# Patient Record
Sex: FEMALE | Race: WHITE
[De-identification: names, ages, dates, MRNs, and addresses within clinical notes are randomized per-mention and may not be internally consistent; named-entity substitution may affect disease eponyms.]

---

## 2019-06-27 ENCOUNTER — HOSPITAL ENCOUNTER (INPATIENT)
Dept: HOSPITAL 54 - TELE | Age: 79
LOS: 13 days | Discharge: NURSING, REHAB OR LONG-TERM CARE | DRG: 242 | End: 2019-07-10
Attending: INTERNAL MEDICINE | Admitting: NURSE PRACTITIONER
Payer: MEDICARE

## 2019-06-27 VITALS — WEIGHT: 189.19 LBS | BODY MASS INDEX: 34.82 KG/M2 | HEIGHT: 62 IN

## 2019-06-27 VITALS — DIASTOLIC BLOOD PRESSURE: 84 MMHG | SYSTOLIC BLOOD PRESSURE: 159 MMHG

## 2019-06-27 DIAGNOSIS — T45.1X5A: ICD-10-CM

## 2019-06-27 DIAGNOSIS — J98.11: ICD-10-CM

## 2019-06-27 DIAGNOSIS — E87.6: ICD-10-CM

## 2019-06-27 DIAGNOSIS — J15.6: ICD-10-CM

## 2019-06-27 DIAGNOSIS — E83.42: ICD-10-CM

## 2019-06-27 DIAGNOSIS — C83.30: ICD-10-CM

## 2019-06-27 DIAGNOSIS — E83.39: ICD-10-CM

## 2019-06-27 DIAGNOSIS — E11.649: ICD-10-CM
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
## 2019-06-27 DIAGNOSIS — I10: ICD-10-CM

## 2019-06-27 DIAGNOSIS — B37.81: Primary | ICD-10-CM

## 2019-06-27 DIAGNOSIS — Z91.81: ICD-10-CM

## 2019-06-27 DIAGNOSIS — J96.10: ICD-10-CM

## 2019-06-27 DIAGNOSIS — E88.09: ICD-10-CM

## 2019-06-27 DIAGNOSIS — B00.2: ICD-10-CM

## 2019-06-27 DIAGNOSIS — B37.0: ICD-10-CM

## 2019-06-27 DIAGNOSIS — D61.810: ICD-10-CM

## 2019-06-27 DIAGNOSIS — K22.8: ICD-10-CM

## 2019-06-27 DIAGNOSIS — E11.65: ICD-10-CM

## 2019-06-27 DIAGNOSIS — D68.69: ICD-10-CM

## 2019-06-27 DIAGNOSIS — Z87.891: ICD-10-CM

## 2019-06-27 DIAGNOSIS — Z96.649: ICD-10-CM

## 2019-06-27 DIAGNOSIS — B37.83: ICD-10-CM

## 2019-06-27 DIAGNOSIS — E80.6: ICD-10-CM

## 2019-06-27 DIAGNOSIS — D64.81: ICD-10-CM

## 2019-06-27 DIAGNOSIS — E44.0: ICD-10-CM

## 2019-06-27 DIAGNOSIS — R13.10: ICD-10-CM

## 2019-06-27 DIAGNOSIS — Z79.899: ICD-10-CM

## 2019-06-27 DIAGNOSIS — Z80.6: ICD-10-CM

## 2019-06-27 DIAGNOSIS — E11.9: ICD-10-CM

## 2019-06-27 DIAGNOSIS — J90: ICD-10-CM

## 2019-06-27 DIAGNOSIS — Z22.322: ICD-10-CM

## 2019-06-27 DIAGNOSIS — E66.01: ICD-10-CM

## 2019-06-27 PROCEDURE — A4216 STERILE WATER/SALINE, 10 ML: HCPCS

## 2019-06-27 PROCEDURE — C1751 CATH, INF, PER/CENT/MIDLINE: HCPCS

## 2019-06-27 PROCEDURE — A9563 P32 NA PHOSPHATE: HCPCS

## 2019-06-27 PROCEDURE — G0378 HOSPITAL OBSERVATION PER HR: HCPCS

## 2019-06-27 NOTE — NUR
TELE LVN INITIAL NOTES

RECEIVED A DIRECT ADMIT FROM [facility]  ACCOMPANIED BY PARA MED[facility]. PT IS ALERT 
ORIENTED SEEMS SO TIRED BUT ABLE TO COMMUNICATE . WITH 02 AT 3 LITERS VIA NASAL CANULA. NO 
SIGNS OF ANY DISTRESS NOTED. AWARE WHERE SHE AT ORIENTED HER HOW TO USED THE CALL LIGHT 
SYSTEM. SKIN WARM AND DRY TO TOUCH NOTICED SOME RASH AND DISCOLORATION ON HER LEFT ARM AND 
LOWER LEGS. NO EDEMA NOTED. SON ALSO AT THE BEDSIDE AND HELPED TO PROVIDE SOME INFORMATION 
REGARDING HIS MOM. TELE SINUS RHYTHM PER MONITOR. KEPT HER WARM AND COMFORTABLE AT ALL 
TIMES. WILL CONTINUE MONITORING.

## 2019-06-28 VITALS — DIASTOLIC BLOOD PRESSURE: 84 MMHG | SYSTOLIC BLOOD PRESSURE: 159 MMHG

## 2019-06-28 VITALS — DIASTOLIC BLOOD PRESSURE: 77 MMHG | SYSTOLIC BLOOD PRESSURE: 156 MMHG

## 2019-06-28 VITALS — SYSTOLIC BLOOD PRESSURE: 145 MMHG | DIASTOLIC BLOOD PRESSURE: 80 MMHG

## 2019-06-28 VITALS — SYSTOLIC BLOOD PRESSURE: 155 MMHG | DIASTOLIC BLOOD PRESSURE: 86 MMHG

## 2019-06-28 VITALS — SYSTOLIC BLOOD PRESSURE: 145 MMHG | DIASTOLIC BLOOD PRESSURE: 75 MMHG

## 2019-06-28 LAB
ALBUMIN SERPL BCP-MCNC: 2 G/DL (ref 3.4–5)
ALP SERPL-CCNC: 65 U/L (ref 46–116)
ALT SERPL W P-5'-P-CCNC: 24 U/L (ref 12–78)
AST SERPL W P-5'-P-CCNC: 28 U/L (ref 15–37)
BASOPHILS # BLD AUTO: 0 /CMM (ref 0–0.2)
BASOPHILS NFR BLD AUTO: 0.3 % (ref 0–2)
BILIRUB SERPL-MCNC: 1.1 MG/DL (ref 0.2–1)
BUN SERPL-MCNC: 10 MG/DL (ref 7–18)
CALCIUM SERPL-MCNC: 6.3 MG/DL (ref 8.5–10.1)
CHLORIDE SERPL-SCNC: 101 MMOL/L (ref 98–107)
CO2 SERPL-SCNC: 28 MMOL/L (ref 21–32)
CREAT SERPL-MCNC: 0.6 MG/DL (ref 0.6–1.3)
EOSINOPHIL NFR BLD AUTO: 1.4 % (ref 0–6)
GLUCOSE SERPL-MCNC: 72 MG/DL (ref 74–106)
HCT VFR BLD AUTO: 36 % (ref 33–45)
HGB BLD-MCNC: 11.8 G/DL (ref 11.5–14.8)
LYMPHOCYTES NFR BLD AUTO: 0.2 /CMM (ref 0.8–4.8)
LYMPHOCYTES NFR BLD AUTO: 6 % (ref 20–44)
MAGNESIUM SERPL-MCNC: 1.2 MG/DL (ref 1.8–2.4)
MCHC RBC AUTO-ENTMCNC: 33 G/DL (ref 31–36)
MCV RBC AUTO: 83 FL (ref 82–100)
MONOCYTES NFR BLD AUTO: 0 /CMM (ref 0.1–1.3)
MONOCYTES NFR BLD AUTO: 0.4 % (ref 2–12)
NEUTROPHILS # BLD AUTO: 3.5 /CMM (ref 1.8–8.9)
NEUTROPHILS NFR BLD AUTO: 91.9 % (ref 43–81)
PHOSPHATE SERPL-MCNC: 2.3 MG/DL (ref 2.5–4.9)
PLATELET # BLD AUTO: 195 /CMM (ref 150–450)
POTASSIUM SERPL-SCNC: 3.3 MMOL/L (ref 3.5–5.1)
PROT SERPL-MCNC: 5 G/DL (ref 6.4–8.2)
RBC # BLD AUTO: 4.31 MIL/UL (ref 4–5.2)
SODIUM SERPL-SCNC: 137 MMOL/L (ref 136–145)
WBC NRBC COR # BLD AUTO: 3.8 K/UL (ref 4.3–11)

## 2019-06-28 RX ADMIN — POTASSIUM CHLORIDE SCH MLS/HR: 200 INJECTION, SOLUTION INTRAVENOUS at 13:23

## 2019-06-28 RX ADMIN — MAGNESIUM SULFATE IN DEXTROSE SCH MLS/HR: 10 INJECTION, SOLUTION INTRAVENOUS at 02:39

## 2019-06-28 RX ADMIN — Medication SCH UNIT: at 09:18

## 2019-06-28 RX ADMIN — Medication PRN MG: at 14:37

## 2019-06-28 RX ADMIN — Medication PRN MG: at 22:29

## 2019-06-28 RX ADMIN — MAGNESIUM OXIDE TAB 400 MG (241.3 MG ELEMENTAL MG) SCH MG: 400 (241.3 MG) TAB at 18:45

## 2019-06-28 RX ADMIN — POTASSIUM CHLORIDE SCH MLS/HR: 200 INJECTION, SOLUTION INTRAVENOUS at 14:31

## 2019-06-28 RX ADMIN — MAGNESIUM SULFATE IN DEXTROSE SCH MLS/HR: 10 INJECTION, SOLUTION INTRAVENOUS at 15:31

## 2019-06-28 RX ADMIN — FAMOTIDINE SCH MG: 10 INJECTION INTRAVENOUS at 12:25

## 2019-06-28 RX ADMIN — Medication PRN MG: at 02:58

## 2019-06-28 RX ADMIN — MAGNESIUM SULFATE IN DEXTROSE SCH MLS/HR: 10 INJECTION, SOLUTION INTRAVENOUS at 03:58

## 2019-06-28 RX ADMIN — FAMOTIDINE SCH MG: 10 INJECTION INTRAVENOUS at 21:13

## 2019-06-28 RX ADMIN — Medication SCH EA: at 21:13

## 2019-06-28 RX ADMIN — MAGNESIUM SULFATE IN DEXTROSE SCH MLS/HR: 10 INJECTION, SOLUTION INTRAVENOUS at 12:26

## 2019-06-28 RX ADMIN — DEXTROSE AND SODIUM CHLORIDE PRN MLS/HR: 5; 450 INJECTION, SOLUTION INTRAVENOUS at 12:01

## 2019-06-28 RX ADMIN — Medication SCH UNIT: at 12:44

## 2019-06-28 NOTE — NUR
RN NOTES



PATIENTS' MAGNESIUM LEVEL WAS RECHECKED THIS AFTERNOON AND WAS 1.5 AFTER GIVING 2 BAGS OF 
MAGNESIUM 1/GM 100ML TODAY. DR WEST MADE AWARE WITH ORDER TO START MAGNESIUM OXIDE 800MG 
BID. WILL CARRY OUT ORDER. MONITORING CONTINUES.

## 2019-06-28 NOTE — NUR
TELE RN OPENING NOTES

 

RECEIVED PATIENT ASLEEP ON BED. IVF STILL INFUSING ON HER LEFT AC PATENT AND INTACT. NO 
REDNESS NOTED. TELE SINUS RHYTHM WITH OCCASIONAL PAC WITH HEART RATE OF 70'S PER MONITOR. PT 
STILL WITH O2 AT 3 LITERS VIA NASAL CANULA. NO SIGNS OF ANY DISTRESS NOTED. ON SEMI FOWLERS 
POSITION WITH SIDE RAILS UP AND BED ALARM SET FOR PT SAFETY. CALL LIGHT WITHIN REACH. WILL 
CONTINUE TO MONITOR.

## 2019-06-28 NOTE — NUR
MS RN NOTE:



PATIENT SON AT BEDSIDE REQUESTING FROM BREATHING TREATMENT THAT PATIENT NORMALLY TAKES AT 
HOME. ON CALL DR. CATHIE WHITING ON FLOOR AND REQUEST IF PATIENT CAN RECEIVE BREATHING 
TREATMENT. VENTOLIN 2.5MG NEB Q6 HOURS AS NEEDED. ORDER NOTED AND CARRIED OUT. WILL CONTINUE 
TO MONITOR.

## 2019-06-28 NOTE — NUR
TELE RN NOTES



RECEIVED ORDER FOR X-RAY ESOPHAGRAM, EXPLAINED THE PROCEDURE TO THE PATIENT AND SON. CONSENT 
SIGNED BY DANNI CASTELLANO (SON) WHICH IS THE DPOA AND FILED.

## 2019-06-28 NOTE — NUR
TELE LVN NOTES

 PT COMPLAINED OF GENERALIZED PAIN SPECIALLY HER UPPER BACK  9/10. SPOKE TO DR KNOWLES AND 
GOT ORDERED MORPHINE 2 MG Q 6 HRS PRN  NOTED AND VERIFIED.

## 2019-06-28 NOTE — NUR
RN NOTES



SWALLOW EVALUATION DONE WITH ORDERS TO CHANGE DIET FROM NPO TO PUREED DIET WITH NECTAR THICK 
LIQUIDS WITH ASPIRATION PRECAUTION.

## 2019-06-28 NOTE — NUR
MS  RN OPENING NOTES

 

PATIENT IN BED AWAKE AND RESTING AT MODERATE HIGH BACKREST POSITION WITH FAMILY AT BEDSIDE. 
A/O X3. VERBALLY RESPONSIVE. ON SUPPLEMENTAL O2 AT 3 LPM VIA N/C, TOLERATING WELL WITH NO 
SIGNS OF DISTRESS NOTED. IV ACCESS ON LEFT AC PATENT AND INTACT, IVF INFUSING AS ORDERED, NO 
S/S OF INFILTRATIONS NOTED. ALL NEEDS NAD CARE PROVIDED WELL. SAFETY MEASURES IN PLACE. BED 
IN LOW LOCKED POSITION WITH SIDE-RAILS UP X2. CALL LIGHT WITHIN REACH. WILL ENDORSE TO NIGHT 
SHIFT NURSE FOR SPRING..

## 2019-06-28 NOTE — NUR
TELE LVN CLOSING NOTES

 PT BACK TO SLEEP AFTER MORNING CARE DONE WITH THE HELPED OF JOHN RAZO. REPOSITION PT FOR 
COMFORT. IVF STILL INFUSING ON HER LEFT AC PATENT AND INTACT. NO REDNESS NOTED. TELE SINUS 
RHYTHM HEART RATE 78 PER MONITOR. MRSA SWAB DONE AS ORDERED AND PER HOSPITAL PROTOCOL . KEPT 
HER WARM AND COMFORTABLE AT ALL TIMES. PT STILL WITH O2 AT 3 LITERS VIA NASAL CANULA. NO 
SIGNS OF ANY DISTRESS NOTED. VITAL SIGNS WITHIN NORMAL LIMIT. ON SEMI FOWLERS POSITION WITH 
SIDE RAILS UP AND BED ALARM SET FOR PT SAFETY. ENDORSE TO AM NURSE HELENA FOR 
CONTINUITY OF CARE. CALL LIGHT WITHIN REACH.

## 2019-06-28 NOTE — NUR
RN NOTES



FAXED AUTHORIZATION TO RELEASED CT CHEST SCAN RESULT TO [facility] FAX # 
[phone] AND RECEIVED CONFIRMATION THAT IT WAS RECEIVED. PT'S SON SAID THAT HE WILL TRY 
TO BRING THE CT CHEST RESULT ALSO TOMORROW.

## 2019-06-28 NOTE — NUR
MS RN NOTE:



PATIENT RESTING IN BED, NO ACUTE DISTRESS NOTED, FAMILY AT BEDSIDE. BREATHING EVEN AND 
UNLABORED, NO SOB NOTED. IV LAC IN PLACE. BED LOCKED AND IN LOWEST POSITION, CALL LIGHT IN 
REACH. WILL CONTINUE TO MONITOR.

## 2019-06-28 NOTE — NUR
TELE LVN NOTES

 GOT CRITICAL VALUE RESULT MAGNESIUM 1.2 . CALLED DR KNOWLES AND RECEIVED ORDERED NOTED 
AND VERIFIED.

## 2019-06-28 NOTE — NUR
TELE LVN NOTES/PAIN MGT RE- ASSESSMENT, 

 CHECKED PT AND SEEN SLEEPING COMFORTABLY IN BED WITHOUT ANY DISCOMFORT OR DISTRESS NOTED. 
TELE SINUS RHYTHM WITH PAC'S HEART RATE 75. WILL CONTINUE MONITORING

## 2019-06-29 VITALS — DIASTOLIC BLOOD PRESSURE: 75 MMHG | SYSTOLIC BLOOD PRESSURE: 140 MMHG

## 2019-06-29 VITALS — DIASTOLIC BLOOD PRESSURE: 77 MMHG | SYSTOLIC BLOOD PRESSURE: 161 MMHG

## 2019-06-29 VITALS — DIASTOLIC BLOOD PRESSURE: 75 MMHG | SYSTOLIC BLOOD PRESSURE: 149 MMHG

## 2019-06-29 LAB
ALBUMIN SERPL BCP-MCNC: 2 G/DL (ref 3.4–5)
ALP SERPL-CCNC: 58 U/L (ref 46–116)
ALT SERPL W P-5'-P-CCNC: 20 U/L (ref 12–78)
AST SERPL W P-5'-P-CCNC: 21 U/L (ref 15–37)
BASE EXCESS BLDA CALC-SCNC: 5.4 MMOL/L
BASOPHILS # BLD AUTO: 0 /CMM (ref 0–0.2)
BASOPHILS NFR BLD AUTO: 0.4 % (ref 0–2)
BILIRUB DIRECT SERPL-MCNC: 0.5 MG/DL (ref 0–0.2)
BILIRUB SERPL-MCNC: 1.4 MG/DL (ref 0.2–1)
BUN SERPL-MCNC: 7 MG/DL (ref 7–18)
CALCIUM SERPL-MCNC: 6.3 MG/DL (ref 8.5–10.1)
CHLORIDE SERPL-SCNC: 97 MMOL/L (ref 98–107)
CHOLEST SERPL-MCNC: 181 MG/DL (ref ?–200)
CO2 SERPL-SCNC: 29 MMOL/L (ref 21–32)
CREAT SERPL-MCNC: 0.5 MG/DL (ref 0.6–1.3)
DO-HGB MFR BLDA: 109.2 MMHG
EOSINOPHIL NFR BLD AUTO: 3.5 % (ref 0–6)
EOSINOPHIL NFR BLD MANUAL: 3 % (ref 0–4)
GLUCOSE SERPL-MCNC: 192 MG/DL (ref 74–106)
HCT VFR BLD AUTO: 32 % (ref 33–45)
HDLC SERPL-MCNC: 63 MG/DL (ref 40–60)
HGB BLD-MCNC: 10.8 G/DL (ref 11.5–14.8)
INHALED O2 CONCENTRATION: 36 %
LDLC SERPL DIRECT ASSAY-MCNC: 97 MG/DL (ref 0–99)
LYMPHOCYTES NFR BLD AUTO: 0.2 /CMM (ref 0.8–4.8)
LYMPHOCYTES NFR BLD AUTO: 12.5 % (ref 20–44)
LYMPHOCYTES NFR BLD MANUAL: 15 % (ref 16–48)
MAGNESIUM SERPL-MCNC: 1.4 MG/DL (ref 1.8–2.4)
MCHC RBC AUTO-ENTMCNC: 34 G/DL (ref 31–36)
MCV RBC AUTO: 83 FL (ref 82–100)
MONOCYTES NFR BLD AUTO: 0 /CMM (ref 0.1–1.3)
MONOCYTES NFR BLD AUTO: 0.9 % (ref 2–12)
NEUTROPHILS # BLD AUTO: 1.2 /CMM (ref 1.8–8.9)
NEUTROPHILS NFR BLD AUTO: 82.7 % (ref 43–81)
NEUTS SEG NFR BLD MANUAL: 82 % (ref 42–76)
PCO2 TEMP ADJ BLDA: 31 MMHG (ref 35–45)
PH TEMP ADJ BLDA: 7.56 [PH] (ref 7.35–7.45)
PHOSPHATE SERPL-MCNC: 1.7 MG/DL (ref 2.5–4.9)
PLATELET # BLD AUTO: 134 /CMM (ref 150–450)
PO2 TEMP ADJ BLDA: 111.5 MMHG (ref 75–100)
POTASSIUM SERPL-SCNC: 2.9 MMOL/L (ref 3.5–5.1)
PROT SERPL-MCNC: 4.9 G/DL (ref 6.4–8.2)
RBC # BLD AUTO: 3.92 MIL/UL (ref 4–5.2)
SAO2 % BLDA: 98 % (ref 92–98.5)
SODIUM SERPL-SCNC: 134 MMOL/L (ref 136–145)
TRIGL SERPL-MCNC: 131 MG/DL (ref 30–150)
TSH SERPL DL<=0.005 MIU/L-ACNC: 1.83 UIU/ML (ref 0.36–3.74)
VENTILATION MODE VENT: (no result)
WBC NRBC COR # BLD AUTO: 1.4 K/UL (ref 4.3–11)

## 2019-06-29 PROCEDURE — B548ZZA ULTRASONOGRAPHY OF SUPERIOR VENA CAVA, GUIDANCE: ICD-10-PCS | Performed by: NURSE PRACTITIONER

## 2019-06-29 PROCEDURE — 02HV33Z INSERTION OF INFUSION DEVICE INTO SUPERIOR VENA CAVA, PERCUTANEOUS APPROACH: ICD-10-PCS | Performed by: NURSE PRACTITIONER

## 2019-06-29 RX ADMIN — FAMOTIDINE SCH MG: 10 INJECTION INTRAVENOUS at 09:06

## 2019-06-29 RX ADMIN — MAGNESIUM OXIDE TAB 400 MG (241.3 MG ELEMENTAL MG) SCH MG: 400 (241.3 MG) TAB at 16:52

## 2019-06-29 RX ADMIN — POTASSIUM CHLORIDE SCH MLS/HR: 200 INJECTION, SOLUTION INTRAVENOUS at 11:12

## 2019-06-29 RX ADMIN — Medication SCH EA: at 12:00

## 2019-06-29 RX ADMIN — Medication PRN MG: at 11:12

## 2019-06-29 RX ADMIN — FLUCONAZOLE SCH MG: 100 TABLET ORAL at 16:52

## 2019-06-29 RX ADMIN — POTASSIUM CHLORIDE SCH MLS/HR: 200 INJECTION, SOLUTION INTRAVENOUS at 13:59

## 2019-06-29 RX ADMIN — FAMOTIDINE SCH MG: 10 INJECTION INTRAVENOUS at 21:01

## 2019-06-29 RX ADMIN — POTASSIUM CHLORIDE SCH MLS/HR: 200 INJECTION, SOLUTION INTRAVENOUS at 14:57

## 2019-06-29 RX ADMIN — POTASSIUM CHLORIDE SCH MLS/HR: 200 INJECTION, SOLUTION INTRAVENOUS at 12:57

## 2019-06-29 RX ADMIN — MAGNESIUM OXIDE TAB 400 MG (241.3 MG ELEMENTAL MG) SCH MG: 400 (241.3 MG) TAB at 09:00

## 2019-06-29 RX ADMIN — Medication PRN MG: at 17:05

## 2019-06-29 RX ADMIN — POTASSIUM CHLORIDE SCH MLS/HR: 200 INJECTION, SOLUTION INTRAVENOUS at 10:12

## 2019-06-29 RX ADMIN — Medication PRN MG: at 23:05

## 2019-06-29 RX ADMIN — MUPIROCIN SCH GM: 20 OINTMENT TOPICAL at 21:02

## 2019-06-29 RX ADMIN — POTASSIUM CHLORIDE SCH MLS/HR: 200 INJECTION, SOLUTION INTRAVENOUS at 16:11

## 2019-06-29 RX ADMIN — Medication SCH EA: at 08:19

## 2019-06-29 RX ADMIN — Medication SCH EA: at 17:30

## 2019-06-29 RX ADMIN — Medication SCH EA: at 21:04

## 2019-06-29 RX ADMIN — TBO-FILGRASTIM SCH MCG: 300 INJECTION, SOLUTION SUBCUTANEOUS at 13:05

## 2019-06-29 NOTE — NUR
MS RN NOTES





RECEIVED CALL FROM LAB REGARDING RIGHT NARE POSITIVE FOR MRSA. CONTACT ISOLATION INITIATED. 
HOSPITALIST TS MADE AWARE. PT PLACED ON BACTROBAN TREATMENT. SANNA/SNEHA MADE AWARE AS WELL.

## 2019-06-29 NOTE — NUR
MS RN NOTES

RECEIVED ON BED A/O X2-3,SPEAK FARSI,ABLE TO UNDERSTAND ENGLISH.APPEARS LETHARGIC,WAS JUST 
MEDICATED WITH MORPHINE BY DAY NURSE AT 1700.WITH PICC LINE RIGHT UPPER ARM INTACT AND 
PATENT,NUTRA PHOS IN PROGRESS.ISOLATION PRECAUTION FOR MRSA NARES.NOTED SWELLING ON BOTH 
UPPER AND LOWER EXTREMITIES.CALL LIGHT IN REACH,NEEDS ANTICIPATED.

## 2019-06-29 NOTE — NUR
MS RN NOTES





PT COMPLAINED OF LOWER BACK PAIN, SCALE OF 8-9 OUT OF 10. PT REQUESTED MORPHINE IV. GIVEN AS 
PRN PAIN MEDICATION AS ORDERED. WILL CONTINUE TO MONITOR.

## 2019-06-29 NOTE — NUR
MS RN OPENING NOTES



RECEIVED PT SLEEPING IN BED, EASILY AROUSED. FARSI SPEAKING, CAN UNDERSTAND AND SPEAK A 
LITTLE ENGLISH. ON SUPPLEMENTARY OXYGEN AT 3L VIA NC, WITH NO ACUTE RESPIRATORY DISTRESS 
NOTED. PT DENIES ANY PAIN OR DISCOMFORT. ALSO, DENIES ANY CONCERNS AND QUESTIONS. IVF D5 1/2 
NS AT 75 ML/HR TO LAC G20, INTACT AND FLUID INFUSING WELL. PT KEPT COMFORTABLE. PT'S BED IN 
LOWEST, LOCKED POSITION WITH SR X3. CALL LIGHT WITHIN REACH. WILL CONTINUE PLAN OF CARE.

## 2019-06-29 NOTE — NUR
MS RN NOTES





PICC LINE WITH 2 LUMENS INSERTED TO RONAN BV, 39CM WITH 35CM AC. CXRAY WAS DONE STAT TO 
CONFIRM PLACEMENT. SANNA/SNEHA MADE AWARE. FAMILY/SON PRESENT AT BEDSIDE. GRANIX 300MG SQ GIVEN 
TO RLQ OF ABDOMEN.

## 2019-06-29 NOTE — NUR
MS RN NOTE:



PATIENT RESTING IN BED, NO ACUTE DISTRESS NOTED. BREATHING EVEN AND UNLABORED, NO SOB NOTED. 
IV LAC IN PLACE. BED LOCKED AND IN LOWEST POSITION, CALL LIGHT IN REACH. WILL ENDORSE TO DAY 
NURSE TO CONTINUE WITH PLAN OF CARE.

## 2019-06-29 NOTE — NUR
MS RN CLOSING NOTES





PT REMAINS RESTING IN BED, EASILY AROUSED. FARSI SPEAKING, CAN UNDERSTAND AND SPEAK A LITTLE 
ENGLISH. 2 FAMILY MEMBERS PRESENT AT BEDSIDE. ON SUPPLEMENTARY OXYGEN AT 3L VIA NC, WITH NO 
ACUTE RESPIRATORY DISTRESS NOTED. PT DENIES ANY PAIN OR DISCOMFORT AT THIS TIME IVF D5 1/2 
NS AT 75 ML/HR TO RONAN PICC LINE (WITH 2LUMENS), INTACT AND FLUID INFUSING WELL. PIV TO LAC 
G20,  FLUSHED WITH NS, INTACT AND OPERATIONAL. ALL NEEDS AND CARE PROVIDED. PT KEPT 
COMFORTABLE. HOB ELEVATED. PT'S BED IN LOWEST, LOCKED POSITION WITH SR X3. CALL LIGHT WITHIN 
REACH. WILL ENDORSE TO INCOMING NIGHT NURSE FOR SPRING.

## 2019-06-30 VITALS — SYSTOLIC BLOOD PRESSURE: 143 MMHG | DIASTOLIC BLOOD PRESSURE: 73 MMHG

## 2019-06-30 VITALS — SYSTOLIC BLOOD PRESSURE: 146 MMHG | DIASTOLIC BLOOD PRESSURE: 69 MMHG

## 2019-06-30 VITALS — SYSTOLIC BLOOD PRESSURE: 140 MMHG | DIASTOLIC BLOOD PRESSURE: 72 MMHG

## 2019-06-30 LAB
BUN SERPL-MCNC: 6 MG/DL (ref 7–18)
CALCIUM SERPL-MCNC: 6.2 MG/DL (ref 8.5–10.1)
CHLORIDE SERPL-SCNC: 97 MMOL/L (ref 98–107)
CO2 SERPL-SCNC: 28 MMOL/L (ref 21–32)
CREAT SERPL-MCNC: 0.5 MG/DL (ref 0.6–1.3)
GLUCOSE SERPL-MCNC: 263 MG/DL (ref 74–106)
PHOSPHATE SERPL-MCNC: 1.8 MG/DL (ref 2.5–4.9)
POTASSIUM SERPL-SCNC: 3.3 MMOL/L (ref 3.5–5.1)
SODIUM SERPL-SCNC: 132 MMOL/L (ref 136–145)

## 2019-06-30 RX ADMIN — Medication SCH EA: at 16:33

## 2019-06-30 RX ADMIN — TBO-FILGRASTIM SCH MCG: 300 INJECTION, SOLUTION SUBCUTANEOUS at 13:06

## 2019-06-30 RX ADMIN — DEXTROSE AND SODIUM CHLORIDE PRN MLS/HR: 5; 450 INJECTION, SOLUTION INTRAVENOUS at 17:45

## 2019-06-30 RX ADMIN — MAGNESIUM OXIDE TAB 400 MG (241.3 MG ELEMENTAL MG) SCH MG: 400 (241.3 MG) TAB at 08:39

## 2019-06-30 RX ADMIN — FAMOTIDINE SCH MG: 10 INJECTION INTRAVENOUS at 08:40

## 2019-06-30 RX ADMIN — Medication PRN MG: at 05:36

## 2019-06-30 RX ADMIN — MUPIROCIN SCH GM: 20 OINTMENT TOPICAL at 08:51

## 2019-06-30 RX ADMIN — Medication SCH EA: at 12:07

## 2019-06-30 RX ADMIN — Medication SCH EA: at 08:34

## 2019-06-30 RX ADMIN — Medication SCH EA: at 21:16

## 2019-06-30 RX ADMIN — DEXTROSE AND SODIUM CHLORIDE PRN MLS/HR: 5; 450 INJECTION, SOLUTION INTRAVENOUS at 04:39

## 2019-06-30 RX ADMIN — FAMOTIDINE SCH MG: 10 INJECTION INTRAVENOUS at 21:14

## 2019-06-30 RX ADMIN — MUPIROCIN SCH GM: 20 OINTMENT TOPICAL at 21:15

## 2019-06-30 RX ADMIN — FLUCONAZOLE SCH MG: 100 TABLET ORAL at 08:39

## 2019-06-30 RX ADMIN — MAGNESIUM OXIDE TAB 400 MG (241.3 MG ELEMENTAL MG) SCH MG: 400 (241.3 MG) TAB at 16:28

## 2019-06-30 NOTE — NUR
M/S RN NOTES



PATIENT IN BED RESTING, ALERT AND ORIENTED X3, NO RESPIRATORY DISTRESS NOTED, NO C/O PAIN AT 
THIS TIME. SKIN WARM TO TOUCH. PICC LINE INTACT AND PATENT WITH IVF OF D5 1/2NS RUNNING. 
PATIENT'S NEEDS ATTENDED, BED ON LOWEST LOCKED POSITION, CALL LIGHT WITHIN REACH. WILL 
CONTINUE TO MONITOR.

## 2019-06-30 NOTE — NUR
MS RN NOTES

RECEIVED LAYING COMFORTABLY ON BED,A/O X2-3,BREATHING NON LABORED,O2 IN USED AT 3L/NC,SPEAK 
FARSI WITH LITTLE ENGLISH,NOTED EDEMA ON BOTH LOWER AND UPPER EXTREMITIES.[employer]CI MATTRESS IN 
USED FOR SKIN MANAGEMENT.SALINE LOCK IN PLACE INFUSING D5 1/2 NS AT 75ML HR RATE.ISOLATION 
PRECAUTION FOR MRSA NARES.FAMILY MEMBERS AT BEDSIDE.CALL LIGHT IN REACH,NEEDS ANTICIPATED.

## 2019-06-30 NOTE — NUR
M/S RN NOTES





PATIENT LYING IN BED RESTING, ALERT AND ORIENTED X2. NO RESPIRATORY DISTRESS NOTED, NO S/S 
OF PAIN AT THIS TIME. SKIN WARM TO TOUCH. IVF OF D5 1/2 INFUSING ON THE [facility] PICC LIJNE, NO 
REDNESS, NO INFILTRATION NOTED.  PATIENT'S NEEDS ATTENDED. BED ON LOWEST LOCKED POSITION, 
CALL LIGHT WITHIN REACH. WILL ENDORSE TO ONCOMING NURSE.

## 2019-06-30 NOTE — NUR
M/S RN NOTES



LILI WEST, ELIZABETH CAME TO SEE PATIENT AND FAMILY IN REGARDS TO DISCHARGE BUT SON DANNI LEFT 
WITHOUT SEEING LILI WEST.

## 2019-06-30 NOTE — NUR
MS RN NOTES

ON BED SLEEPING,IVF IN PROGRESS,PICC LINE REMAINS PATENT.BLOOD DRAWN DONE ON PICC LINE RED 
PORT,FLUSHED WITH NS AND KEPT PATENT.REPOSITION PER PROTOCOL.SON AT BEDSIDE,AWAITING FOR 
HOSPITALIST TO DISCUSS ABOUT PLAN OF CARE REGARDING PATIENT.IN NO ACUTE DISTRESS.WILL 
ENDORSE TO YVETTE BLANCO FOR SPRING.

## 2019-07-01 VITALS — SYSTOLIC BLOOD PRESSURE: 129 MMHG | DIASTOLIC BLOOD PRESSURE: 72 MMHG

## 2019-07-01 VITALS — SYSTOLIC BLOOD PRESSURE: 136 MMHG | DIASTOLIC BLOOD PRESSURE: 66 MMHG

## 2019-07-01 VITALS — DIASTOLIC BLOOD PRESSURE: 76 MMHG | SYSTOLIC BLOOD PRESSURE: 154 MMHG

## 2019-07-01 LAB
APPEARANCE UR: (no result)
BASOPHILS # BLD AUTO: 0 /CMM (ref 0–0.2)
BASOPHILS NFR BLD AUTO: 0 % (ref 0–2)
BILIRUB UR QL STRIP: NEGATIVE
BUN SERPL-MCNC: 5 MG/DL (ref 7–18)
CALCIUM SERPL-MCNC: 6.1 MG/DL (ref 8.5–10.1)
CHLORIDE SERPL-SCNC: 97 MMOL/L (ref 98–107)
CO2 SERPL-SCNC: 27 MMOL/L (ref 21–32)
COLOR UR: YELLOW
CREAT SERPL-MCNC: 0.6 MG/DL (ref 0.6–1.3)
EOSINOPHIL NFR BLD AUTO: 4.3 % (ref 0–6)
EOSINOPHIL NFR BLD MANUAL: 2 % (ref 0–4)
GLUCOSE SERPL-MCNC: 271 MG/DL (ref 74–106)
GLUCOSE UR STRIP-MCNC: (no result) MG/DL
HCT VFR BLD AUTO: 28 % (ref 33–45)
HGB BLD-MCNC: 9.4 G/DL (ref 11.5–14.8)
HGB UR QL STRIP: NEGATIVE ERY/UL
KETONES UR STRIP-MCNC: (no result) MG/DL
LEUKOCYTE ESTERASE UR QL STRIP: (no result)
LYMPHOCYTES NFR BLD AUTO: 0.2 /CMM (ref 0.8–4.8)
LYMPHOCYTES NFR BLD AUTO: 67.2 % (ref 20–44)
LYMPHOCYTES NFR BLD MANUAL: 75 % (ref 16–48)
MAGNESIUM SERPL-MCNC: 0.9 MG/DL (ref 1.8–2.4)
MCHC RBC AUTO-ENTMCNC: 34 G/DL (ref 31–36)
MCV RBC AUTO: 82 FL (ref 82–100)
MONOCYTES NFR BLD AUTO: 0.1 /CMM (ref 0.1–1.3)
MONOCYTES NFR BLD AUTO: 22.6 % (ref 2–12)
MONOCYTES NFR BLD MANUAL: 19 % (ref 0–11)
NEUTROPHILS # BLD AUTO: 0 /CMM (ref 1.8–8.9)
NEUTROPHILS NFR BLD AUTO: 5.9 % (ref 43–81)
NEUTS SEG NFR BLD MANUAL: 4 % (ref 42–76)
NITRITE UR QL STRIP: NEGATIVE
PH UR STRIP: 7.5 [PH] (ref 5–8)
PHOSPHATE SERPL-MCNC: 1.6 MG/DL (ref 2.5–4.9)
PLATELET # BLD AUTO: 82 /CMM (ref 150–450)
POTASSIUM SERPL-SCNC: 3.4 MMOL/L (ref 3.5–5.1)
PROT UR QL STRIP: (no result) MG/DL
RBC # BLD AUTO: 3.42 MIL/UL (ref 4–5.2)
RBC #/AREA URNS HPF: (no result) /HPF (ref 0–2)
SODIUM SERPL-SCNC: 132 MMOL/L (ref 136–145)
UROBILINOGEN UR STRIP-MCNC: 1 EU/DL
WBC #/AREA URNS HPF: (no result) /HPF (ref 0–3)
WBC NRBC COR # BLD AUTO: 0.2 K/UL (ref 4.3–11)

## 2019-07-01 RX ADMIN — MAGNESIUM OXIDE TAB 400 MG (241.3 MG ELEMENTAL MG) SCH MG: 400 (241.3 MG) TAB at 16:07

## 2019-07-01 RX ADMIN — Medication PRN MG: at 11:41

## 2019-07-01 RX ADMIN — MUPIROCIN SCH GM: 20 OINTMENT TOPICAL at 09:53

## 2019-07-01 RX ADMIN — MAGNESIUM SULFATE IN DEXTROSE SCH MLS/HR: 10 INJECTION, SOLUTION INTRAVENOUS at 11:39

## 2019-07-01 RX ADMIN — MAGNESIUM SULFATE IN DEXTROSE SCH MLS/HR: 10 INJECTION, SOLUTION INTRAVENOUS at 16:07

## 2019-07-01 RX ADMIN — Medication SCH EA: at 09:36

## 2019-07-01 RX ADMIN — FAMOTIDINE SCH MG: 10 INJECTION INTRAVENOUS at 09:37

## 2019-07-01 RX ADMIN — MAGNESIUM OXIDE TAB 400 MG (241.3 MG ELEMENTAL MG) SCH MG: 400 (241.3 MG) TAB at 09:38

## 2019-07-01 RX ADMIN — DEXTROSE AND SODIUM CHLORIDE PRN MLS/HR: 5; 450 INJECTION, SOLUTION INTRAVENOUS at 15:07

## 2019-07-01 RX ADMIN — FAMOTIDINE SCH MG: 10 INJECTION INTRAVENOUS at 23:30

## 2019-07-01 RX ADMIN — MUPIROCIN SCH GM: 20 OINTMENT TOPICAL at 23:35

## 2019-07-01 RX ADMIN — MAGNESIUM OXIDE TAB 400 MG (241.3 MG ELEMENTAL MG) SCH MG: 400 (241.3 MG) TAB at 16:59

## 2019-07-01 RX ADMIN — MAGNESIUM SULFATE IN DEXTROSE SCH MLS/HR: 10 INJECTION, SOLUTION INTRAVENOUS at 09:53

## 2019-07-01 RX ADMIN — CEFEPIME HYDROCHLORIDE SCH MLS/HR: 1 INJECTION, POWDER, FOR SOLUTION INTRAMUSCULAR; INTRAVENOUS at 18:29

## 2019-07-01 RX ADMIN — Medication SCH EA: at 22:00

## 2019-07-01 RX ADMIN — Medication SCH EA: at 11:40

## 2019-07-01 RX ADMIN — Medication SCH EA: at 17:23

## 2019-07-01 RX ADMIN — Medication PRN MG: at 02:51

## 2019-07-01 RX ADMIN — MAGNESIUM SULFATE IN DEXTROSE SCH MLS/HR: 10 INJECTION, SOLUTION INTRAVENOUS at 17:22

## 2019-07-01 RX ADMIN — FLUCONAZOLE SCH MG: 100 TABLET ORAL at 09:38

## 2019-07-01 RX ADMIN — TBO-FILGRASTIM SCH MCG: 300 INJECTION, SOLUTION SUBCUTANEOUS at 12:51

## 2019-07-01 NOTE — NUR
MS RN NOTES



0740 MAGNESIUM LAB LEVEL REPORTED WAS 0.9. REPEAT MAGNESIUM LAB IS 0.9. ORDERS FROM DR. JACKSON, 
2GM IV MAG. WILL CONTINUE TO MONITOR.

## 2019-07-01 NOTE — NUR
RN NOTES



FAMILY AT BEDSIDE REFUSED FOR MAGIC MOUTHWASH TO BE GIVEN AT THIS TIME SINCE Pt IS ASLEEP 
AND THEY DO NOT WANT HER TO BE DISTURBED.

## 2019-07-01 NOTE — NUR
MS RN OPENING NOTES



PATIENT IN BED RESTING COMFORTABLY. PATIENT BREATHING ON OXYGEN NC 3L. PATIENT BREATHING IS 
EVEN AND UNLABORED. PATIENT IN NO ACUTE DISTRESS. NO SOB NOTED. PATIENT WITH NO FACIAL 
GRIMACING AT THIS TIME. PATIENT BED IS LOCKED AND IN LOWEST POSITION. CALL LIGHT WITHIN 
REACH. WILL CONTINUE TO MONITOR.

## 2019-07-01 NOTE — NUR
MS RN NOTES



RECEIVED WBC CRITICAL VALUE OF 0.2. NOTIFIED DR. MANUEL SAUCEDO. NO NEW ORDERS AT THIS 
TIME. PATIENT IN NO ACUTE DISTRESS. WILL CONTINUE TO MONITOR.

## 2019-07-01 NOTE — NUR
RN OPENING NOTES



RECEIVED REPORT FROM KIRSTENHIHARMONY RNELEAZAR. FOUND Pt ASLEEP IN BED, FAMILY VISITING AT BEDSIDE. 
NO S/S OF ACUTE DISTRESS OR SOB NOTED. RESPIRATIONS EVEN AND UNLABORED AT THIS TIME WITH 
EQUAL CHEST RISE AND FALL. NO SIGNS OF PAIN NOTED AT THIS TIME. Pt IS A/OX2. IV ACCESS ON 
RONAN PICC LINE, #18G; IVF D5 1/2NS @75ML/HR. SAFETY MEASURES IN PLACE. BED LOW, LOCKED, HOB 
ELEVATED, SIDE RAILS UP, BED ALARM ON. WILL CONTINUE TO MONITOR Pt's CONDITION AND SAFETY 
THROUGHOUT THE NIGHT.

## 2019-07-01 NOTE — NUR
MS RN NOTES



NOTIFIED DR. JACKSON OF PATIENTS FAMILY WANTING TO SPEAK WITH HIM. DR. JACKSON AT THIS TIME HAS NOT 
SPOKEN TO PATIENT FAMILY. PATIENT FAMILY WAITING BY BEDSIDE. PATIENT IN NO ACUTE DISTRESS. 
WILL CONTINUE TO MONITOR.

## 2019-07-01 NOTE — NUR
MS RN CLOSING NOTES



PATIENT LAYING IN BED, RESTING COMFORTABLY. PATIENT BREATHING ON OXYGEN NC 3L. PATIENT 
BREATHING IS EVEN AND UNLABORED.  PATIENT IN NO ACUTE DISTRESS. PATIENT RECEIVING CEFEPIME 
100 ML/HR ON RIGHT ARM PICC LINE PER MD VASQUEZ. DR. JACKSON NOTIFIED ABOUT PATIENTS FAMILY REQUEST 
TO TALK TO DOCTOR, PATIENTS FAMILY IS AT THE BEDSIDE. BED ALARM IS ON. ALL NURSING NEEDS 
MET. PATIENT KEPT CLEAN, DRY, AND REPOSITIONED. PATIENT MAINTAINED ON NEUTROPENIC 
PRECAUTIONS. PATIENT BED IS LOCKED AND IN LOWEST POSITION. CALL LIGHT WITHIN REACH. WILL 
ENDORSE CARE TO PM SHIFT.

## 2019-07-01 NOTE — NUR
MS RN NOTES



PATIENT MOST RECENT MAGNESIUM LAB LEVEL IS 1.5. PER DR. JACKSON GIVE 2GM IV MAGNESIUM IF BELOW 
1.7. WILL CARRY OUT ORDERS. PATIENT IN NO ACUTE DISTRESS. WILL CONTINUE TO MONITOR.

## 2019-07-01 NOTE — NUR
MS/RN   Magnesium level



Received call from pharmacy to follow up with Dr Payton regarding magnesium level being 
0.9. Per pharmacy protocol, magnesium to be replaced by pharmacist if level greater than 
1.2.

Dr Ferraro already contacted by primary nurse at 0933 and made aware of level, order given to 
infuse 2gm IVPB. MD also contacted by pharmacist.

Dr Ferraro paged by charge nurse to be made aware of level and to obtain new order. Per MD, 
order already given for 2gm this morning and no further order was needed. Pharmacy made 
aware.

## 2019-07-01 NOTE — NUR
MS RN NOTES



RECEIVED CRITICAL VALUE MAGNESIUM 0.9 AND WBC 0.5. CONTACTED DR. MONI WHITING AWARE AND NOTIFIED. 


WAITING ON REDRAW FOR LABS.

## 2019-07-01 NOTE — NUR
MS RN NOTES

FAIRLY RESTED.MEDICATED ONE TIME WITH MORPHINE 2MG IV.REMAINS ON ISOLATION 
PRECAUTION.REPOSITION PER PROTOCOL.SWELLING ON BOTH UPPER AND LOWER EXTREMITIES 
IMPROVING.PICC LINE REMAINS PATENT ON RIGHT UPPER ARM.IN NO ACUTE DISTRESS

## 2019-07-01 NOTE — NUR
MS RN NOTES



PATIENT IS RESTING IN BED. PATIENT IN NO ACUTE DISTRESS. PATIENT PLACED ON NEUTROPENIC 
PRECAUTIONS. WILL CONTINUE TO MONITOR.

## 2019-07-01 NOTE — NUR
MS RN NOTES

AWAKE THIS TIME,MOANING AND RESTLESS.MORPHINE 2MG IV GIVEN AS ORDERED.DIAPER 
CHANGED,REPOSITIONED.

## 2019-07-02 VITALS — SYSTOLIC BLOOD PRESSURE: 129 MMHG | DIASTOLIC BLOOD PRESSURE: 71 MMHG

## 2019-07-02 VITALS — SYSTOLIC BLOOD PRESSURE: 138 MMHG | DIASTOLIC BLOOD PRESSURE: 68 MMHG

## 2019-07-02 VITALS — SYSTOLIC BLOOD PRESSURE: 134 MMHG | DIASTOLIC BLOOD PRESSURE: 62 MMHG

## 2019-07-02 VITALS — DIASTOLIC BLOOD PRESSURE: 62 MMHG | SYSTOLIC BLOOD PRESSURE: 134 MMHG

## 2019-07-02 LAB
BUN SERPL-MCNC: 5 MG/DL (ref 7–18)
CALCIUM SERPL-MCNC: 6.4 MG/DL (ref 8.5–10.1)
CHLORIDE SERPL-SCNC: 95 MMOL/L (ref 98–107)
CO2 SERPL-SCNC: 26 MMOL/L (ref 21–32)
CREAT SERPL-MCNC: 0.6 MG/DL (ref 0.6–1.3)
GLUCOSE SERPL-MCNC: 306 MG/DL (ref 74–106)
HCT VFR BLD AUTO: 27 % (ref 33–45)
HGB BLD-MCNC: 8.9 G/DL (ref 11.5–14.8)
LYMPHOCYTES NFR BLD MANUAL: 59 % (ref 16–48)
MCHC RBC AUTO-ENTMCNC: 34 G/DL (ref 31–36)
MCV RBC AUTO: 82 FL (ref 82–100)
MONOCYTES NFR BLD MANUAL: 27 % (ref 0–11)
NEUTS SEG NFR BLD MANUAL: 14 % (ref 42–76)
PHOSPHATE SERPL-MCNC: 1.4 MG/DL (ref 2.5–4.9)
PLATELET # BLD AUTO: 95 /CMM (ref 150–450)
POTASSIUM SERPL-SCNC: 3.1 MMOL/L (ref 3.5–5.1)
RBC # BLD AUTO: 3.23 MIL/UL (ref 4–5.2)
SODIUM SERPL-SCNC: 130 MMOL/L (ref 136–145)
WBC NRBC COR # BLD AUTO: 0.3 K/UL (ref 4.3–11)

## 2019-07-02 RX ADMIN — FAMOTIDINE SCH MG: 10 INJECTION INTRAVENOUS at 08:42

## 2019-07-02 RX ADMIN — Medication SCH EA: at 11:45

## 2019-07-02 RX ADMIN — DEXTROSE AND SODIUM CHLORIDE PRN MLS/HR: 5; 450 INJECTION, SOLUTION INTRAVENOUS at 08:33

## 2019-07-02 RX ADMIN — Medication PRN MG: at 16:18

## 2019-07-02 RX ADMIN — Medication PRN MG: at 20:33

## 2019-07-02 RX ADMIN — DEXTROSE MONOHYDRATE SCH MLS/HR: 50 INJECTION, SOLUTION INTRAVENOUS at 22:24

## 2019-07-02 RX ADMIN — Medication SCH EA: at 21:21

## 2019-07-02 RX ADMIN — FLUCONAZOLE SCH MG: 100 TABLET ORAL at 08:31

## 2019-07-02 RX ADMIN — POTASSIUM CHLORIDE SCH MEQ: 1.5 POWDER, FOR SOLUTION ORAL at 13:47

## 2019-07-02 RX ADMIN — FAMOTIDINE SCH MG: 10 INJECTION INTRAVENOUS at 20:33

## 2019-07-02 RX ADMIN — MAGNESIUM OXIDE TAB 400 MG (241.3 MG ELEMENTAL MG) SCH MG: 400 (241.3 MG) TAB at 08:32

## 2019-07-02 RX ADMIN — POTASSIUM CHLORIDE SCH MEQ: 1.5 POWDER, FOR SOLUTION ORAL at 11:39

## 2019-07-02 RX ADMIN — Medication SCH EA: at 17:07

## 2019-07-02 RX ADMIN — MUPIROCIN SCH GM: 20 OINTMENT TOPICAL at 20:36

## 2019-07-02 RX ADMIN — CEFEPIME HYDROCHLORIDE SCH MLS/HR: 1 INJECTION, POWDER, FOR SOLUTION INTRAMUSCULAR; INTRAVENOUS at 21:22

## 2019-07-02 RX ADMIN — TBO-FILGRASTIM SCH MCG: 300 INJECTION, SOLUTION SUBCUTANEOUS at 12:07

## 2019-07-02 RX ADMIN — Medication PRN MG: at 09:02

## 2019-07-02 RX ADMIN — MUPIROCIN SCH APPLIC: 20 OINTMENT TOPICAL at 08:51

## 2019-07-02 RX ADMIN — CEFEPIME HYDROCHLORIDE SCH MLS/HR: 1 INJECTION, POWDER, FOR SOLUTION INTRAMUSCULAR; INTRAVENOUS at 08:42

## 2019-07-02 RX ADMIN — MAGNESIUM OXIDE TAB 400 MG (241.3 MG ELEMENTAL MG) SCH MG: 400 (241.3 MG) TAB at 17:03

## 2019-07-02 RX ADMIN — Medication SCH EA: at 08:32

## 2019-07-02 RX ADMIN — MAGNESIUM OXIDE TAB 400 MG (241.3 MG ELEMENTAL MG) SCH MG: 400 (241.3 MG) TAB at 17:00

## 2019-07-02 NOTE — NUR
m/s lvn: initial assessment



received pt in bed with eyes close, arousable but appears weak. reverse isolation precaution 
maintained. no s/s of discomfort. breakfast served, but pt refusing to eat at this time. 
will continue to monitor.

## 2019-07-02 NOTE — NUR
m/s lvn: notes



2 sons at bedside still awaiting for rigoberto (brother, dpoa) to make decision on consenting 
for us guided thoracentesis. Left message to son/Rigoberto ([phone]) via voice mail.

## 2019-07-02 NOTE — NUR
m/s lvn: notes



2 sons came back to visit, but still no liane. informed the brothers that i'm still 
awaiting on consent for us guided thoracentesis. will continue to monitor.

## 2019-07-02 NOTE — NUR
m/s lvn: notes



pt cannot tolerate magic mouthwash when offered, pt takes little and unable to tolerate the 
rest.

## 2019-07-02 NOTE — NUR
m/s lvn: notes



2 sons visiting at this time and informed them or new order for us guided thoracentesis and 
consent needed for it. educated son re: us guided and will have to call another brother and 
then will decide today.

## 2019-07-02 NOTE — NUR
m/s lvn: notes



Left another message tp son/Rigoberto ([phone]) re: consent needed for us guided 
thoracentesis via voice mail.

## 2019-07-02 NOTE — NUR
m/s lvn: cardio consult



seen and examined by dr. westbrook at this time re: cardiac clearance with new order. order 
acknowledged.

## 2019-07-02 NOTE — NUR
RN CLOSING NOTES



NO SIGNIFICANT CHANGES IN Pt's CONDITION. NO S/S OF ACUTE DISTRESS OR SEVERE SOB NOTED 
DURING THE NIGHT. ALL NEEDS MET AND ATTENDED TO. SAFETY MEASURES IN PLACE. TEMP HAS 
DECREASED TO 98.5F WILL ENDORSE TO DAYSHIFT RN FOR Pt's SPRING.

## 2019-07-02 NOTE — NUR
rn notes



sodium phosphate was running at the same time when maxipime and vanco were both due @2100. 
sodium phosphate will be running for 4hrs, from start time of 1821, end time should be 
around 2221. Maxipime was started on 2nd line of picc line. once maxipime was finised 
started vanco.

## 2019-07-02 NOTE — NUR
m/s lvn: notes



f/u made to pharmacy, spoke to kellee re: granix medication. awaiting for differential 
results as stated.

## 2019-07-02 NOTE — NUR
RN NOTES



PER Pt's SON, SAID HE SPOKE WITH THE ONCOLOGIST IN THE ROOM ABOUT AN HOUR AGO AND SAID THAT 
SHE WILL ADJUST THE TIME FREQUENCY FOR MORPHINE 2MG, AND WAS ASKING IF THE ORDER WAS 
CHANGED. CHECKED THE eMAR AND SAW THAT THERE WAS NO NEW ORDER FOR THE TIME FREQUENCY OF 
MORPHINE. CALLED DR VASQUEZ AND SPOKE WITH DR. VASQUEZ ON THE PHONE, TO VERIFY, PER DR VASQUEZ SAID 
IT IS OK TO CHANGE THE FREQUENCY OF MORPHINE 2MG TO Q4H PRN. WILL CARRY OUT ORDER.

## 2019-07-02 NOTE — NUR
m/s lvn: derick mishra (us tech) called and informed her that we still don't have the consent from the son 
and will call her once we get the consent.

## 2019-07-02 NOTE — NUR
m/s lvn: notes



2 sons remains at bedside and still awaiting for liane (francesco) to come tonight. still needs 
consent for us guided thoracentesis. per 2 sons there's going to be another brother to come 
in and will decide tonight. needs attended.

## 2019-07-02 NOTE — NUR
RN OEP

-------------------------------------------------------------------------------

Addendum: 07/02/19 at 2001 by HERB SHEARER RN

-------------------------------------------------------------------------------

RN OPENING NOTES



RECEIVED REPORT FROM [facility] FRANCIS WELSH. FOUND Pt RESTING IN BED, FAMILY VISITING AT 
BEDSIDE. NO S/S OF ACUTE DISTRESS OR SEVERE SOB NOTED. Pt IS A/OX2. IV ACCESS ON RONAN PICC 
LINE #18G, IVF D5 1/2NS @75ML/HR. PER REPORT TEMP IS 99.4F. PER SON SAID THAT ONCOLOGIST WAS 
HERE EARLIER AND SAID THAT SHE WILL ADJUST THE TIME FREQUENCY FOR THE MORPHINE. WILL CALL DR VASQUEZ TO VERIFY. SAFETY MEASURES IN PLACE. BED LOW, LOCKED, HOB ELEVATED, SIDE RAILS UP, CALL 
LIGHT AND BEDSIDE TABLE WITHIN REACH. WILL CONTINUE TO MONITOR Pt's CONDITION AND SAFETY 
THROUGHOUT THE NIGHT.

## 2019-07-02 NOTE — NUR
m/s lvn: notes



pt in bed with eyes close, no s/s of distress and appears comfortable. will continue to 
monitor.

## 2019-07-02 NOTE — NUR
m/s lvn: md visit



seen and examined by dr. vogel. pt for cardiac consult. dr. westbrook notified and made aware.

## 2019-07-02 NOTE — NUR
m/s lvn: notes



pt c/o 9/10 generalized discomfort. medicated with morphine 2mg ivp by rn. instructed to 
call for assistance.

## 2019-07-02 NOTE — NUR
m/s lvn: notes



pt appears to be sleepy, but easily arousable. encourage to take magic mouthwash and due 
med, but pt keeps saying no x 3. will continue to monitor.

## 2019-07-02 NOTE — NUR
m/s lvn: notes



pt c/o 8/10 generalized discomfort. medicated with morphine 2mg ivp by rn. instructed to 
call for assistance.

## 2019-07-03 VITALS — DIASTOLIC BLOOD PRESSURE: 60 MMHG | SYSTOLIC BLOOD PRESSURE: 134 MMHG

## 2019-07-03 VITALS — DIASTOLIC BLOOD PRESSURE: 53 MMHG | SYSTOLIC BLOOD PRESSURE: 119 MMHG

## 2019-07-03 VITALS — SYSTOLIC BLOOD PRESSURE: 141 MMHG | DIASTOLIC BLOOD PRESSURE: 64 MMHG

## 2019-07-03 LAB
BASOPHILS # BLD AUTO: 0 /CMM (ref 0–0.2)
BASOPHILS NFR BLD AUTO: 0.5 % (ref 0–2)
BUN SERPL-MCNC: 5 MG/DL (ref 7–18)
CALCIUM SERPL-MCNC: 6.9 MG/DL (ref 8.5–10.1)
CHLORIDE SERPL-SCNC: 96 MMOL/L (ref 98–107)
CO2 SERPL-SCNC: 28 MMOL/L (ref 21–32)
CREAT SERPL-MCNC: 0.7 MG/DL (ref 0.6–1.3)
EOSINOPHIL NFR BLD AUTO: 1.1 % (ref 0–6)
GLUCOSE SERPL-MCNC: 354 MG/DL (ref 74–106)
HCT VFR BLD AUTO: 26 % (ref 33–45)
HGB BLD-MCNC: 8.7 G/DL (ref 11.5–14.8)
LYMPHOCYTES NFR BLD AUTO: 0.4 /CMM (ref 0.8–4.8)
LYMPHOCYTES NFR BLD AUTO: 16.2 % (ref 20–44)
LYMPHOCYTES NFR BLD MANUAL: 12 % (ref 16–48)
MAGNESIUM SERPL-MCNC: 1.1 MG/DL (ref 1.8–2.4)
MCHC RBC AUTO-ENTMCNC: 33 G/DL (ref 31–36)
MCV RBC AUTO: 82 FL (ref 82–100)
MONOCYTES NFR BLD AUTO: 0.3 /CMM (ref 0.1–1.3)
MONOCYTES NFR BLD AUTO: 14.8 % (ref 2–12)
MONOCYTES NFR BLD MANUAL: 10 % (ref 0–11)
NEUTROPHILS # BLD AUTO: 1.5 /CMM (ref 1.8–8.9)
NEUTROPHILS NFR BLD AUTO: 67.4 % (ref 43–81)
NEUTS BAND NFR BLD MANUAL: 2 % (ref 0–5)
NEUTS SEG NFR BLD MANUAL: 76 % (ref 42–76)
PHOSPHATE SERPL-MCNC: 2.4 MG/DL (ref 2.5–4.9)
PLATELET # BLD AUTO: 109 /CMM (ref 150–450)
POTASSIUM SERPL-SCNC: 2.9 MMOL/L (ref 3.5–5.1)
RBC # BLD AUTO: 3.16 MIL/UL (ref 4–5.2)
SODIUM SERPL-SCNC: 132 MMOL/L (ref 136–145)
WBC NRBC COR # BLD AUTO: 2.2 K/UL (ref 4.3–11)

## 2019-07-03 RX ADMIN — POTASSIUM CHLORIDE SCH MLS/HR: 200 INJECTION, SOLUTION INTRAVENOUS at 11:46

## 2019-07-03 RX ADMIN — MUPIROCIN SCH GM: 20 OINTMENT TOPICAL at 22:39

## 2019-07-03 RX ADMIN — Medication PRN MG: at 02:15

## 2019-07-03 RX ADMIN — MAGNESIUM SULFATE IN DEXTROSE SCH MLS/HR: 10 INJECTION, SOLUTION INTRAVENOUS at 09:05

## 2019-07-03 RX ADMIN — Medication PRN MG: at 09:04

## 2019-07-03 RX ADMIN — FAMOTIDINE SCH MG: 10 INJECTION INTRAVENOUS at 22:34

## 2019-07-03 RX ADMIN — FAMOTIDINE SCH MG: 10 INJECTION INTRAVENOUS at 08:21

## 2019-07-03 RX ADMIN — Medication SCH EA: at 22:00

## 2019-07-03 RX ADMIN — MAGNESIUM SULFATE IN DEXTROSE SCH MLS/HR: 10 INJECTION, SOLUTION INTRAVENOUS at 10:40

## 2019-07-03 RX ADMIN — MAGNESIUM SULFATE IN DEXTROSE SCH MLS/HR: 10 INJECTION, SOLUTION INTRAVENOUS at 12:20

## 2019-07-03 RX ADMIN — CEFEPIME HYDROCHLORIDE SCH MLS/HR: 1 INJECTION, POWDER, FOR SOLUTION INTRAMUSCULAR; INTRAVENOUS at 08:21

## 2019-07-03 RX ADMIN — POTASSIUM CHLORIDE SCH MLS/HR: 200 INJECTION, SOLUTION INTRAVENOUS at 14:08

## 2019-07-03 RX ADMIN — Medication SCH ML: at 17:00

## 2019-07-03 RX ADMIN — Medication PRN MG: at 22:57

## 2019-07-03 RX ADMIN — Medication SCH EA: at 08:21

## 2019-07-03 RX ADMIN — POTASSIUM CHLORIDE SCH MLS/HR: 200 INJECTION, SOLUTION INTRAVENOUS at 12:19

## 2019-07-03 RX ADMIN — FLUCONAZOLE SCH MG: 100 TABLET ORAL at 09:20

## 2019-07-03 RX ADMIN — Medication SCH EA: at 18:04

## 2019-07-03 RX ADMIN — DEXTROSE MONOHYDRATE SCH MLS/HR: 50 INJECTION, SOLUTION INTRAVENOUS at 15:26

## 2019-07-03 RX ADMIN — MAGNESIUM OXIDE TAB 400 MG (241.3 MG ELEMENTAL MG) SCH MG: 400 (241.3 MG) TAB at 17:00

## 2019-07-03 RX ADMIN — MAGNESIUM OXIDE TAB 400 MG (241.3 MG ELEMENTAL MG) SCH MG: 400 (241.3 MG) TAB at 09:20

## 2019-07-03 RX ADMIN — Medication SCH EA: at 11:51

## 2019-07-03 RX ADMIN — MAGNESIUM SULFATE IN DEXTROSE SCH MLS/HR: 10 INJECTION, SOLUTION INTRAVENOUS at 11:46

## 2019-07-03 RX ADMIN — MUPIROCIN SCH GM: 20 OINTMENT TOPICAL at 08:29

## 2019-07-03 RX ADMIN — Medication PRN MG: at 14:08

## 2019-07-03 RX ADMIN — TBO-FILGRASTIM SCH MCG: 300 INJECTION, SOLUTION SUBCUTANEOUS at 12:19

## 2019-07-03 RX ADMIN — POTASSIUM CHLORIDE SCH MLS/HR: 200 INJECTION, SOLUTION INTRAVENOUS at 09:05

## 2019-07-03 RX ADMIN — POTASSIUM CHLORIDE SCH MLS/HR: 200 INJECTION, SOLUTION INTRAVENOUS at 10:40

## 2019-07-03 RX ADMIN — CEFEPIME HYDROCHLORIDE SCH MLS/HR: 1 INJECTION, POWDER, FOR SOLUTION INTRAMUSCULAR; INTRAVENOUS at 22:34

## 2019-07-03 RX ADMIN — POTASSIUM CHLORIDE SCH MLS/HR: 200 INJECTION, SOLUTION INTRAVENOUS at 15:26

## 2019-07-03 NOTE — NUR
RN OPENING NOTES



RECEIVED REPORT FROM [facility]. FOUND Pt ASLEEP IN BED. FAMILY VISITING AT BED 
SIDE. RESPIRATIONS EVEN AND UNLABORED WITH EQUAL CHEST RISE.  Pt IS A/OX1-2, VERBAL. IV 
ACCESS ON RONAN PICC LINE @ TKO. SAFETY MEASURES IN PLACE. BED LOW, LOCKED, HOB ELEVATED, SIDE 
RAILS UP, CALL LIGHT AND BEDSIDE TABLE WITHIN REACH. BED ALARM ON. WILL CONTINUE TO MONITOR 
Pt's CONDITION AND SAFETY THROUGHOUT THE NIGHT.

## 2019-07-03 NOTE — NUR
RN CLOSING NOTES



NO SIGNIFICANT CHANGES IN Pt's CONDITION. NO S/S OF ACUTE DISTRESS OR SEVERE SOB NOTED 
DURING THE NIGHT. ALL NEEDS MET AND ATTENDED TO. SAFETY MEASURES IN PLACE. WILL ENDORSE TO 
DAYSHIFT RN FOR Pt's SPRING. WAITING ON FAMILY TO CONSENT TO US GUIDED THORACENTESIS.

## 2019-07-03 NOTE — NUR
AT 2 PM I TALKED TO FRANCIS ALEJO , FAMILY MEMBERS DID NOT DECIDE IF THEY WANT TO DO THE 
PROCEDURE, WAITING FOR NEW INSTRUCTIONS

## 2019-07-03 NOTE — NUR
RN CLOSING NOTE



PT STABLE. ALL PT NEEDS ANTICIPATED AND MET. PT'S FAMILY AGREEABLE TO THORACENTESIS, CONSENT 
SIGNED AND PLACED IN CHART. SAFETY MEASURES IN PLACE, CALL LIGHT IN REACH. WILL ENDORSE TO 
NIGHT SHIFT FOR SPRING.

## 2019-07-03 NOTE — NUR
RN OPENING NOTES



REC PT. PT STABLE RESTING IN BED. NO S/S OF RESP DISTRESS/SOB. PT IS ON ASPIRATION PREC, 
DYSPHAGIA R/T ESOPHAGEAL CA/TUMOR. FAMILY REMAINS UNDECIDED REGARDING BOTH THORACENTESIS AND 
PEG PLACEMENT. ST F/U TO OCCUR IN AM. PT IS ON NEUTROPENIC PRECAUTIONS WITH LAST WBC ON 
7/2/19 RESULTED >1. SAFETY MEASURES IN PLACE, CALL LIGHT WITHIN REACH. WILL CONT TO MONITOR.

## 2019-07-04 VITALS — SYSTOLIC BLOOD PRESSURE: 137 MMHG | DIASTOLIC BLOOD PRESSURE: 67 MMHG

## 2019-07-04 VITALS — DIASTOLIC BLOOD PRESSURE: 61 MMHG | SYSTOLIC BLOOD PRESSURE: 136 MMHG

## 2019-07-04 VITALS — DIASTOLIC BLOOD PRESSURE: 60 MMHG | SYSTOLIC BLOOD PRESSURE: 125 MMHG

## 2019-07-04 VITALS — SYSTOLIC BLOOD PRESSURE: 140 MMHG | DIASTOLIC BLOOD PRESSURE: 75 MMHG

## 2019-07-04 VITALS — SYSTOLIC BLOOD PRESSURE: 134 MMHG | DIASTOLIC BLOOD PRESSURE: 64 MMHG

## 2019-07-04 VITALS — DIASTOLIC BLOOD PRESSURE: 71 MMHG | SYSTOLIC BLOOD PRESSURE: 143 MMHG

## 2019-07-04 VITALS — SYSTOLIC BLOOD PRESSURE: 154 MMHG | DIASTOLIC BLOOD PRESSURE: 75 MMHG

## 2019-07-04 VITALS — DIASTOLIC BLOOD PRESSURE: 69 MMHG | SYSTOLIC BLOOD PRESSURE: 149 MMHG

## 2019-07-04 VITALS — DIASTOLIC BLOOD PRESSURE: 75 MMHG | SYSTOLIC BLOOD PRESSURE: 146 MMHG

## 2019-07-04 LAB
BASOPHILS # BLD AUTO: 0 /CMM (ref 0–0.2)
BASOPHILS NFR BLD AUTO: 0.6 % (ref 0–2)
BUN SERPL-MCNC: 4 MG/DL (ref 7–18)
CALCIUM SERPL-MCNC: 7.6 MG/DL (ref 8.5–10.1)
CHLORIDE SERPL-SCNC: 97 MMOL/L (ref 98–107)
CO2 SERPL-SCNC: 29 MMOL/L (ref 21–32)
CREAT SERPL-MCNC: 0.7 MG/DL (ref 0.6–1.3)
EOSINOPHIL NFR BLD AUTO: 0.4 % (ref 0–6)
GLUCOSE SERPL-MCNC: 279 MG/DL (ref 74–106)
HCT VFR BLD AUTO: 23 % (ref 33–45)
HGB BLD-MCNC: 6.9 G/DL (ref 11.5–14.8)
LYMPHOCYTES NFR BLD AUTO: 0.5 /CMM (ref 0.8–4.8)
LYMPHOCYTES NFR BLD AUTO: 6.2 % (ref 20–44)
LYMPHOCYTES NFR BLD MANUAL: 3 % (ref 16–48)
MCHC RBC AUTO-ENTMCNC: 30 G/DL (ref 31–36)
MCV RBC AUTO: 92 FL (ref 82–100)
METAMYELOCYTES NFR BLD MANUAL: 4 % (ref 0–0)
MONOCYTES NFR BLD AUTO: 0.3 /CMM (ref 0.1–1.3)
MONOCYTES NFR BLD AUTO: 3.4 % (ref 2–12)
MONOCYTES NFR BLD MANUAL: 3 % (ref 0–11)
MYELOCYTES NFR BLD MANUAL: 2 % (ref 0–0)
NEUTROPHILS # BLD AUTO: 7.6 /CMM (ref 1.8–8.9)
NEUTROPHILS NFR BLD AUTO: 89.4 % (ref 43–81)
NEUTS BAND NFR BLD MANUAL: 18 % (ref 0–5)
NEUTS SEG NFR BLD MANUAL: 70 % (ref 42–76)
PLATELET # BLD AUTO: 127 /CMM (ref 150–450)
POTASSIUM SERPL-SCNC: 3.6 MMOL/L (ref 3.5–5.1)
RBC # BLD AUTO: 2.47 MIL/UL (ref 4–5.2)
SODIUM SERPL-SCNC: 133 MMOL/L (ref 136–145)
WBC NRBC COR # BLD AUTO: 8.5 K/UL (ref 4.3–11)

## 2019-07-04 PROCEDURE — 30233N1 TRANSFUSION OF NONAUTOLOGOUS RED BLOOD CELLS INTO PERIPHERAL VEIN, PERCUTANEOUS APPROACH: ICD-10-PCS | Performed by: NURSE PRACTITIONER

## 2019-07-04 RX ADMIN — Medication SCH EA: at 16:57

## 2019-07-04 RX ADMIN — MAGNESIUM OXIDE TAB 400 MG (241.3 MG ELEMENTAL MG) SCH MG: 400 (241.3 MG) TAB at 16:58

## 2019-07-04 RX ADMIN — FLUCONAZOLE SCH MG: 100 TABLET ORAL at 08:40

## 2019-07-04 RX ADMIN — MUPIROCIN SCH GM: 20 OINTMENT TOPICAL at 09:36

## 2019-07-04 RX ADMIN — Medication SCH ML: at 08:27

## 2019-07-04 RX ADMIN — MAGNESIUM OXIDE TAB 400 MG (241.3 MG ELEMENTAL MG) SCH MG: 400 (241.3 MG) TAB at 08:41

## 2019-07-04 RX ADMIN — Medication SCH ML: at 12:26

## 2019-07-04 RX ADMIN — Medication SCH EA: at 12:27

## 2019-07-04 RX ADMIN — Medication PRN MG: at 08:42

## 2019-07-04 RX ADMIN — FAMOTIDINE SCH MG: 10 INJECTION INTRAVENOUS at 08:40

## 2019-07-04 RX ADMIN — Medication SCH EA: at 08:27

## 2019-07-04 RX ADMIN — MUPIROCIN SCH GM: 20 OINTMENT TOPICAL at 21:44

## 2019-07-04 RX ADMIN — FAMOTIDINE SCH MG: 10 INJECTION INTRAVENOUS at 21:42

## 2019-07-04 RX ADMIN — DEXTROSE MONOHYDRATE SCH MLS/HR: 50 INJECTION, SOLUTION INTRAVENOUS at 09:32

## 2019-07-04 RX ADMIN — CEFEPIME HYDROCHLORIDE SCH MLS/HR: 1 INJECTION, POWDER, FOR SOLUTION INTRAMUSCULAR; INTRAVENOUS at 21:42

## 2019-07-04 RX ADMIN — Medication SCH ML: at 16:57

## 2019-07-04 RX ADMIN — TBO-FILGRASTIM SCH MCG: 300 INJECTION, SOLUTION SUBCUTANEOUS at 13:20

## 2019-07-04 RX ADMIN — Medication SCH EA: at 21:43

## 2019-07-04 RX ADMIN — CEFEPIME HYDROCHLORIDE SCH MLS/HR: 1 INJECTION, POWDER, FOR SOLUTION INTRAMUSCULAR; INTRAVENOUS at 08:31

## 2019-07-04 NOTE — NUR
RN CLOSING NOTES



NO SIGNIFICANT CHANGES IN Pt's CONDITION. NO S/S OF ACUTE DISTRESS OR SEVERE SOB NOTED 
DURING THE NIGHT. ALL NEEDS MET AND ATTENDED TO. SAFETY MEASURES IN PLACE. WILL ENDORSE TO 
DAYSHIFT RN FOR Pt's SPRING. THORACENTESIS SCHEDULED FOR TODAY, CONSENT SIGNED BY SON, PLACED 
IN CHART.

## 2019-07-04 NOTE — NUR
M/S RN NOTES



PATIENT LYING IN BED AWAKE, FAMILY AT BEDSIDE.  PATIENT IN NO RESPIRATORY DISTRESS, ON NASAL 
CANULA 02 AT 3L. PATIENT C/O GENERALIZED PAIN LEVEL OF 8/10, PAIN MEDICATION GIVEN AS 
PRESCRIBED, WILL REASSESS. IV PICC LINE INTACT AND PATENT, NO REDNESS, NO INFILTRATION. SKIN 
WARM TO TOUCH. PATIENT'S NEEDS ATTENDED. BED ON LOWEST AND LOCKED POSITION. WILL CONTINUE TO 
MONITOR.

## 2019-07-04 NOTE — NUR
M/S RN NOTES



PATIENT IN NO RESPIRATORY DISTRESS, NO C/O PAIN AT THIS TIME. BLOOD TRANSFUSION INFUSING, 
TOLERATING WELL. NO S/S OF ADVERSE REACTIONS. SKIN WARM TO TOUCH. PATIENT'S NEEDS ATTENDED. 
BED ON LOWEST LOCKED POSITION, CALL LIGHT WITHIN REACH. FAMILY AT BEDSIDE. ENDORSED TO 
ONCOMING NURSE.

## 2019-07-04 NOTE — NUR
M/S RN NOTES



DR. MORENO NOTIFIED PATIENT'S HGB AT 6.9 AND HCT AT 23, ORDERED 1 UNIT OF PRBC PER DR. MORENO.

## 2019-07-04 NOTE — NUR
M/S RN NOTES



BLOOD TRANSFUSION STARTED AT 1835, V/S CHECKED. PATIENT WITH NO SIGNS OF ADVERSE REACTIONS, 
NO RESPIRATORY DISTRESS.

## 2019-07-04 NOTE — NUR
FRANCIS MS NOTES

PATIENT IS S/P BLOOD TRANSFUSION. VSS. NO S/SX OF ADVERSE EFFECTS. WILL CONTINUE TO MONITOR.

## 2019-07-04 NOTE — NUR
M/S RN NOTES



INFORMED BY KAREN FROM RADIOLOGY THAT THERE'S NO RADIOLOGIST ON SITE, IF THORACENTESIS CAN 
BE DONE TOMORROW AM. NOTIFIED DR. MORENO AND WAS OKAY TO BE DONE TOMORROW MORNING PER DR. MORENO.

## 2019-07-04 NOTE — NUR
RN MS OPENING NOTES

RECEIVED PATIENT IN BED AWAKE, ALERT AND ORIENTED X1-2, VERBALLY RESPONSIVE. FAMILY AT 
BEDSIDE. BREATHING EVEN AND UNLABORED. NO SOB NOTED. ON 2LPM VIA NC. CURRENTLY WITH NO 
COMPLAINTS OF PAIN OR DISCOMFORT. NO FACIAL GRIMACING. RIGHT UPPER ARM PICC LINE INTACT AND 
PATENT WITH BLOOD TRANSFUSION STILL ONGOING. PATIENT SHOWS ON S/SX OF ADVERSE EFFECTS FROM 
BLOOD TRANSFUSION. VSS. SKIN DRY AND WARM TO TOUCH. AFEBRILE. ALL OTHER NEEDS ATTENDED TO . 
SAFETY MEASURES IN PLACE. CALL LIGHT WITHIN REACH. WILL CONTINUE TO MONITOR.

## 2019-07-04 NOTE — NUR
RN MS NOTES

BLOOD TRANSFUSION STILL ON GOING. VSS. NO S/SX OF ADVERSE EFFECTS. WILL CONTINUE TO MONITOR.

## 2019-07-05 VITALS — SYSTOLIC BLOOD PRESSURE: 138 MMHG | DIASTOLIC BLOOD PRESSURE: 75 MMHG

## 2019-07-05 VITALS — SYSTOLIC BLOOD PRESSURE: 132 MMHG | DIASTOLIC BLOOD PRESSURE: 72 MMHG

## 2019-07-05 VITALS — DIASTOLIC BLOOD PRESSURE: 71 MMHG | SYSTOLIC BLOOD PRESSURE: 144 MMHG

## 2019-07-05 LAB
BASOPHILS # BLD AUTO: 0 /CMM (ref 0–0.2)
BASOPHILS NFR BLD AUTO: 0.2 % (ref 0–2)
BUN SERPL-MCNC: 5 MG/DL (ref 7–18)
CALCIUM SERPL-MCNC: 7.9 MG/DL (ref 8.5–10.1)
CHLORIDE SERPL-SCNC: 96 MMOL/L (ref 98–107)
CO2 SERPL-SCNC: 30 MMOL/L (ref 21–32)
CREAT SERPL-MCNC: 0.7 MG/DL (ref 0.6–1.3)
EOSINOPHIL NFR BLD AUTO: 0.2 % (ref 0–6)
GLUCOSE SERPL-MCNC: 296 MG/DL (ref 74–106)
HCT VFR BLD AUTO: 31 % (ref 33–45)
HGB BLD-MCNC: 10.3 G/DL (ref 11.5–14.8)
LYMPHOCYTES NFR BLD AUTO: 0.7 /CMM (ref 0.8–4.8)
LYMPHOCYTES NFR BLD AUTO: 4 % (ref 20–44)
LYMPHOCYTES NFR BLD MANUAL: 7 % (ref 16–48)
MAGNESIUM SERPL-MCNC: 1.3 MG/DL (ref 1.8–2.4)
MCHC RBC AUTO-ENTMCNC: 33 G/DL (ref 31–36)
MCV RBC AUTO: 82 FL (ref 82–100)
MONOCYTES NFR BLD AUTO: 0.8 /CMM (ref 0.1–1.3)
MONOCYTES NFR BLD AUTO: 4.5 % (ref 2–12)
MONOCYTES NFR BLD MANUAL: 13 % (ref 0–11)
NEUTROPHILS # BLD AUTO: 16.4 /CMM (ref 1.8–8.9)
NEUTROPHILS NFR BLD AUTO: 91.1 % (ref 43–81)
NEUTS SEG NFR BLD MANUAL: 80 % (ref 42–76)
PHOSPHATE SERPL-MCNC: 2.1 MG/DL (ref 2.5–4.9)
PLATELET # BLD AUTO: 180 /CMM (ref 150–450)
POTASSIUM SERPL-SCNC: 3.2 MMOL/L (ref 3.5–5.1)
RBC # BLD AUTO: 3.81 MIL/UL (ref 4–5.2)
SODIUM SERPL-SCNC: 132 MMOL/L (ref 136–145)
WBC NRBC COR # BLD AUTO: 18 K/UL (ref 4.3–11)

## 2019-07-05 PROCEDURE — 0W9B3ZZ DRAINAGE OF LEFT PLEURAL CAVITY, PERCUTANEOUS APPROACH: ICD-10-PCS

## 2019-07-05 RX ADMIN — Medication SCH EA: at 12:24

## 2019-07-05 RX ADMIN — FAMOTIDINE SCH MG: 10 INJECTION INTRAVENOUS at 20:57

## 2019-07-05 RX ADMIN — MAGNESIUM SULFATE IN DEXTROSE SCH MLS/HR: 10 INJECTION, SOLUTION INTRAVENOUS at 12:49

## 2019-07-05 RX ADMIN — MAGNESIUM SULFATE IN DEXTROSE SCH MLS/HR: 10 INJECTION, SOLUTION INTRAVENOUS at 14:32

## 2019-07-05 RX ADMIN — Medication SCH ML: at 08:07

## 2019-07-05 RX ADMIN — FAMOTIDINE SCH MG: 10 INJECTION INTRAVENOUS at 08:35

## 2019-07-05 RX ADMIN — CEFEPIME HYDROCHLORIDE SCH MLS/HR: 1 INJECTION, POWDER, FOR SOLUTION INTRAMUSCULAR; INTRAVENOUS at 20:57

## 2019-07-05 RX ADMIN — MAGNESIUM OXIDE TAB 400 MG (241.3 MG ELEMENTAL MG) SCH MG: 400 (241.3 MG) TAB at 17:27

## 2019-07-05 RX ADMIN — DEXTROSE MONOHYDRATE SCH MLS/HR: 50 INJECTION, SOLUTION INTRAVENOUS at 03:32

## 2019-07-05 RX ADMIN — MUPIROCIN SCH APPLIC: 20 OINTMENT TOPICAL at 20:58

## 2019-07-05 RX ADMIN — Medication SCH EA: at 17:30

## 2019-07-05 RX ADMIN — POTASSIUM CHLORIDE SCH MEQ: 1500 TABLET, EXTENDED RELEASE ORAL at 12:49

## 2019-07-05 RX ADMIN — MAGNESIUM OXIDE TAB 400 MG (241.3 MG ELEMENTAL MG) SCH MG: 400 (241.3 MG) TAB at 08:35

## 2019-07-05 RX ADMIN — Medication SCH EA: at 21:47

## 2019-07-05 RX ADMIN — DEXTROSE MONOHYDRATE SCH MLS/HR: 50 INJECTION, SOLUTION INTRAVENOUS at 21:47

## 2019-07-05 RX ADMIN — Medication SCH EA: at 08:07

## 2019-07-05 RX ADMIN — MUPIROCIN SCH GM: 20 OINTMENT TOPICAL at 08:56

## 2019-07-05 RX ADMIN — POTASSIUM CHLORIDE SCH MEQ: 1500 TABLET, EXTENDED RELEASE ORAL at 14:32

## 2019-07-05 RX ADMIN — Medication SCH ML: at 17:23

## 2019-07-05 RX ADMIN — CEFEPIME HYDROCHLORIDE SCH MLS/HR: 1 INJECTION, POWDER, FOR SOLUTION INTRAMUSCULAR; INTRAVENOUS at 08:34

## 2019-07-05 RX ADMIN — Medication SCH ML: at 12:23

## 2019-07-05 RX ADMIN — FLUCONAZOLE SCH MG: 100 TABLET ORAL at 08:35

## 2019-07-05 RX ADMIN — MAGNESIUM SULFATE IN DEXTROSE SCH MLS/HR: 10 INJECTION, SOLUTION INTRAVENOUS at 16:20

## 2019-07-05 RX ADMIN — MAGNESIUM SULFATE IN DEXTROSE SCH MLS/HR: 10 INJECTION, SOLUTION INTRAVENOUS at 17:22

## 2019-07-05 RX ADMIN — Medication PRN MG: at 08:35

## 2019-07-05 NOTE — NUR
RN OPEN NOTES



RECEIVED PATIENT RESTING IN BED WITH FAMILY AT BEDSIDE, EASILY AROUSABLE. A/O X2. NO SIGNS 
OF DISTRESS OR DISCOMFORT. BREATHING EVEN AND UNLABORED. ON 3LPM O2 VIA NC. HAS RONAN PICC 
LINE WITH MAGNESIUM INFUSING, PATENT AND INTACT, NO SIGNS OF REDNESS OR INFILTRATION. DENIES 
ANY PAIN AT THIS TIME. BED IN LOW LOCKED POSITION WITH SIDE RAILS X2. CALL LIGHT WITHIN 
REACH. WILL CONTINUE TO MONITOR.

## 2019-07-05 NOTE — NUR
M/S RN NOTES



PATIENT AWAKE, LYING IN BED, FAMILY AT BEDSIDE. PATIENT IN NO RESPIRATORY DISTRESS NOTED, ON 
NASAL CANULA 02 AT 2L. NO C/O PAIN AT THIS TIME. IV MAGNESIUM INFUSING AT 100ML/HR ON THE 
RONAN PICC LINE, PATENT AND INTACT. PATIENT'S NEEDS ATTENDED. BED ON LOWEST  LOCKED POSITION, 
CALL LIGHT WITHIN REACH. WILL ENDORSE TO ONCOMING NURSE.

## 2019-07-05 NOTE — NUR
RN MS NOTES

CALLED SPOUSE, MARYLIN, FOR CONSENT FOR ANESTHESIA, FOR THORACENTESIS PROCEDURE TODAY. DID NOT 
ANSWER. LEFT VM.

## 2019-07-05 NOTE — NUR
M/S RN NOTES



THORACENTESIS DONE WITH FLUID TOTAL OF 560ML. PATIENT TOLERATED IT WELL. PLEURAL FLUID SENT 
TO LAB FOR CULTURE STUDIES.

## 2019-07-05 NOTE — NUR
RN MS CLOSING NOTES

PATIENT RESTING IN BED. NOT IN ANY DISTRESS. BREATHING EVEN AND UNLABORED. NO SOB NOTED. NO 
S/S OF PAIN OR DISCOMFORT. NO FACIAL GRIMACING. RIGHT UPPER ARM PICC LINE INTACT AND PATENT. 
S/P BLOOD TRANSFUSION LAST NIGHT WITH NO ADVERSE EFFECTS NOTED. SKIN DRY AND WARM TO TOUCH. 
AFEBRILE. ALL OTHER NEEDS ATTENDED TO. KEPT CLEAN DRY AND COMFORTABLE. SAFETY MEASURES IN 
PLACE. CALL LIGHT WITHIN REACH. WILL ENDORSE TO ONCOMING NURSE FOR SPRING.

## 2019-07-05 NOTE — NUR
M/S RN NOTES



PATIENT AWAKE, LYING IN BED. ALERT AND ORIENTED X2, SON AT BEDSIDE. PATIENT IN NO 
RESPIRATORY DISTRESS, ON NASAL CANULA 02 AT 2L. PATIENT COMPLAINING OF BACK PAIN, UNABLE TO 
SCALE BUT PATIENT MOANING AND GRASPING SITE, GIVEN MORPHINE IV, WILL REASSESS. SKIN WARM TO 
TOUCH. IV PICC LINE ON THE RONAN INTACT AND PATENT. PATIENT'S NEEDS ATTENDED, BED ON LOWEST 
LOCKED POSITION, CALL LIGHT WITHIN REACH. WILL CONTINUE TO MONITOR.

## 2019-07-06 VITALS — SYSTOLIC BLOOD PRESSURE: 151 MMHG | DIASTOLIC BLOOD PRESSURE: 68 MMHG

## 2019-07-06 VITALS — DIASTOLIC BLOOD PRESSURE: 64 MMHG | SYSTOLIC BLOOD PRESSURE: 140 MMHG

## 2019-07-06 VITALS — DIASTOLIC BLOOD PRESSURE: 74 MMHG | SYSTOLIC BLOOD PRESSURE: 137 MMHG

## 2019-07-06 LAB
BASOPHILS # BLD AUTO: 0 /CMM (ref 0–0.2)
BASOPHILS NFR BLD AUTO: 0.2 % (ref 0–2)
BUN SERPL-MCNC: 6 MG/DL (ref 7–18)
CALCIUM SERPL-MCNC: 7.9 MG/DL (ref 8.5–10.1)
CHLORIDE SERPL-SCNC: 96 MMOL/L (ref 98–107)
CO2 SERPL-SCNC: 31 MMOL/L (ref 21–32)
CREAT SERPL-MCNC: 0.7 MG/DL (ref 0.6–1.3)
EOSINOPHIL NFR BLD AUTO: 0.3 % (ref 0–6)
GLUCOSE SERPL-MCNC: 327 MG/DL (ref 74–106)
HCT VFR BLD AUTO: 30 % (ref 33–45)
HGB BLD-MCNC: 10 G/DL (ref 11.5–14.8)
LYMPHOCYTES NFR BLD AUTO: 0.5 /CMM (ref 0.8–4.8)
LYMPHOCYTES NFR BLD AUTO: 5.6 % (ref 20–44)
LYMPHOCYTES NFR BLD MANUAL: 3 % (ref 16–48)
MAGNESIUM SERPL-MCNC: 1.6 MG/DL (ref 1.8–2.4)
MCHC RBC AUTO-ENTMCNC: 33 G/DL (ref 31–36)
MCV RBC AUTO: 83 FL (ref 82–100)
METAMYELOCYTES NFR BLD MANUAL: 5 % (ref 0–0)
MONOCYTES NFR BLD AUTO: 0.6 /CMM (ref 0.1–1.3)
MONOCYTES NFR BLD AUTO: 6.3 % (ref 2–12)
MONOCYTES NFR BLD MANUAL: 10 % (ref 0–11)
MYELOCYTES NFR BLD MANUAL: 8 % (ref 0–0)
NEUTROPHILS # BLD AUTO: 7.7 /CMM (ref 1.8–8.9)
NEUTROPHILS NFR BLD AUTO: 87.6 % (ref 43–81)
NEUTS BAND NFR BLD MANUAL: 10 % (ref 0–5)
NEUTS SEG NFR BLD MANUAL: 64 % (ref 42–76)
PHOSPHATE SERPL-MCNC: 2.5 MG/DL (ref 2.5–4.9)
PLATELET # BLD AUTO: 188 /CMM (ref 150–450)
POTASSIUM SERPL-SCNC: 3.1 MMOL/L (ref 3.5–5.1)
RBC # BLD AUTO: 3.64 MIL/UL (ref 4–5.2)
SODIUM SERPL-SCNC: 135 MMOL/L (ref 136–145)
WBC NRBC COR # BLD AUTO: 8.8 K/UL (ref 4.3–11)

## 2019-07-06 RX ADMIN — INSULIN GLARGINE SCH UNIT: 100 INJECTION, SOLUTION SUBCUTANEOUS at 22:35

## 2019-07-06 RX ADMIN — Medication SCH ML: at 17:00

## 2019-07-06 RX ADMIN — DEXTROSE MONOHYDRATE SCH MLS/HR: 50 INJECTION, SOLUTION INTRAVENOUS at 15:49

## 2019-07-06 RX ADMIN — POTASSIUM CHLORIDE SCH MLS/HR: 200 INJECTION, SOLUTION INTRAVENOUS at 17:05

## 2019-07-06 RX ADMIN — FAMOTIDINE SCH MG: 10 INJECTION INTRAVENOUS at 21:32

## 2019-07-06 RX ADMIN — DEXTROSE MONOHYDRATE SCH MLS/HR: 50 INJECTION, SOLUTION INTRAVENOUS at 22:23

## 2019-07-06 RX ADMIN — FAMOTIDINE SCH MG: 10 INJECTION INTRAVENOUS at 10:25

## 2019-07-06 RX ADMIN — CEFEPIME HYDROCHLORIDE SCH MLS/HR: 1 INJECTION, POWDER, FOR SOLUTION INTRAMUSCULAR; INTRAVENOUS at 10:25

## 2019-07-06 RX ADMIN — POTASSIUM CHLORIDE SCH MLS/HR: 200 INJECTION, SOLUTION INTRAVENOUS at 13:49

## 2019-07-06 RX ADMIN — POTASSIUM CHLORIDE SCH MEQ: 1.5 POWDER, FOR SOLUTION ORAL at 09:30

## 2019-07-06 RX ADMIN — CEFEPIME HYDROCHLORIDE SCH MLS/HR: 1 INJECTION, POWDER, FOR SOLUTION INTRAMUSCULAR; INTRAVENOUS at 21:32

## 2019-07-06 RX ADMIN — FLUCONAZOLE SCH MG: 100 TABLET ORAL at 17:45

## 2019-07-06 RX ADMIN — POTASSIUM CHLORIDE SCH MLS/HR: 200 INJECTION, SOLUTION INTRAVENOUS at 11:59

## 2019-07-06 RX ADMIN — Medication SCH ML: at 12:00

## 2019-07-06 RX ADMIN — Medication SCH EA: at 18:48

## 2019-07-06 RX ADMIN — Medication PRN MG: at 09:10

## 2019-07-06 RX ADMIN — Medication PRN MG: at 20:09

## 2019-07-06 RX ADMIN — Medication SCH ML: at 08:00

## 2019-07-06 RX ADMIN — POTASSIUM CHLORIDE SCH MLS/HR: 200 INJECTION, SOLUTION INTRAVENOUS at 18:47

## 2019-07-06 RX ADMIN — MAGNESIUM OXIDE TAB 400 MG (241.3 MG ELEMENTAL MG) SCH MG: 400 (241.3 MG) TAB at 17:45

## 2019-07-06 RX ADMIN — FLUCONAZOLE SCH MG: 100 TABLET ORAL at 10:30

## 2019-07-06 RX ADMIN — Medication SCH EA: at 21:41

## 2019-07-06 RX ADMIN — POTASSIUM CHLORIDE SCH MEQ: 1.5 POWDER, FOR SOLUTION ORAL at 10:30

## 2019-07-06 RX ADMIN — Medication SCH EA: at 12:00

## 2019-07-06 RX ADMIN — Medication SCH EA: at 07:30

## 2019-07-06 RX ADMIN — MUPIROCIN SCH APPLIC: 20 OINTMENT TOPICAL at 10:31

## 2019-07-06 RX ADMIN — MAGNESIUM OXIDE TAB 400 MG (241.3 MG ELEMENTAL MG) SCH MG: 400 (241.3 MG) TAB at 09:00

## 2019-07-06 NOTE — NUR
MS/RN

CALLED [facility], FLETCHER GARCIA NP, IS ON CALL, RE: MAGNESIUM LEVEL 1.6 WAS NOT 
REPLACED.

## 2019-07-06 NOTE — NUR
MS/RN

PATIENT IS AWAKE, MILDLY RESTLESS, PER SON THE PATIENT IS HAVING BACK PAIN 10/10 AND 
REQUESTED TO GIVE MORPHINE.  MORPHINE 2 MG IVP WAS GIVEN AS ORDERED.  WILL MONITOR.

## 2019-07-06 NOTE — NUR
MS/RN

PATIENT IS SLEEPING, AROUSABLE, APPEAR COMFORTABALE, NO DISTRESS NOTED, CALL LIGHT IN REACH. 
WILL CONTINUE TO MONITOR.

## 2019-07-06 NOTE — NUR
MS/RN

PATIENT IS SLEEPING, NO CHANGE IN CONDITION, ENDORSED TO FRANCIS DENISE FOR CONTINUITY OF CARE.

## 2019-07-06 NOTE — NUR
RN CLOSING NOTES



PATIENT RESTING IN BED, EASILY AROUSABLE. A/O X2. NO SIGNS OF DISTRESS OR DISCOMFORT. 
BREATHING EVEN AND UNLABORED. ON 3LPM O2 VIA NC. HAS RONAN PICC LINE, PATENT AND INTACT, NO 
SIGNS OF REDNESS OR INFILTRATION. DENIES ANY PAIN AT THIS TIME. ALL NEEDS MET. NO 
SIGNIFICANT CHANGES THROUGH THE NIGHT. PATIENT KEPT CLEAN DRY AND COMFORTABLE. REPOSITIONED 
Q2H AND PRN. BED IN LOW LOCKED POSITION WITH SIDE RAILS X2. CALL LIGHT WITHIN REACH. WILL 
ENDORSE TO AM SHIFT FOR SPRING.

## 2019-07-06 NOTE — NUR
MS/RN

FLETCHER GARCIA CALLED BACK INFORMED HIM ABOUT THE MAGNESIUM LEVEL OF 1.6, THAT THE PATIENT 
ONLY TOOK PART OF THE MAGNESIUM 800 PO.  NO FURTHER ORDER WAS RECEIVED.

## 2019-07-06 NOTE — NUR
RN OPENING NOTES



RECEIVED PATIENT FROM FRANCIS BAILEY. PATIENT IS CURRENTLY ASLEEP AT THIS TIME, EASILY AROUSABLE. 
A/O X 2. NO SIGNS OF RESPIRATORY DISTRESS. NO SIGNS OF SHORTNESS OF BREATH. ON 3LPM O2 VIA 
NC. HAS RONAN PICC LINE, PATENT AND INTACT. NO SIGNS OF REDNESS OR INFILTRATION. NO SIGNS OF 
FACIAL GRIMACING OR DISCOMFORT TO INDICATE ANY PAIN AT THIS TIME. SAFETY PRECAUTIONS 
IMPLEMENTED; CALL LIGHT WITHIN REACH, BED LOW, BED LOCKED, SIDE RAILS UP X2. WILL CONTINUE 
TO MONITOR PATIENT.

## 2019-07-06 NOTE — NUR
sons alternating sitting in with pt.pt. remains in isolation.nash np spoke with one of 
son's regarding pt. status.

## 2019-07-07 VITALS — DIASTOLIC BLOOD PRESSURE: 63 MMHG | SYSTOLIC BLOOD PRESSURE: 176 MMHG

## 2019-07-07 VITALS — DIASTOLIC BLOOD PRESSURE: 63 MMHG | SYSTOLIC BLOOD PRESSURE: 136 MMHG

## 2019-07-07 VITALS — SYSTOLIC BLOOD PRESSURE: 147 MMHG | DIASTOLIC BLOOD PRESSURE: 67 MMHG

## 2019-07-07 VITALS — SYSTOLIC BLOOD PRESSURE: 145 MMHG | DIASTOLIC BLOOD PRESSURE: 72 MMHG

## 2019-07-07 LAB
BASOPHILS # BLD AUTO: 0 /CMM (ref 0–0.2)
BASOPHILS NFR BLD AUTO: 0.4 % (ref 0–2)
BUN SERPL-MCNC: 8 MG/DL (ref 7–18)
CALCIUM SERPL-MCNC: 8 MG/DL (ref 8.5–10.1)
CHLORIDE SERPL-SCNC: 97 MMOL/L (ref 98–107)
CO2 SERPL-SCNC: 32 MMOL/L (ref 21–32)
CREAT SERPL-MCNC: 0.7 MG/DL (ref 0.6–1.3)
EOSINOPHIL NFR BLD AUTO: 0.6 % (ref 0–6)
EOSINOPHIL NFR BLD MANUAL: 2 % (ref 0–4)
FERRITIN SERPL-MCNC: 2172 NG/ML (ref 8–388)
GLUCOSE SERPL-MCNC: 313 MG/DL (ref 74–106)
HCT VFR BLD AUTO: 29 % (ref 33–45)
HGB BLD-MCNC: 9.7 G/DL (ref 11.5–14.8)
IRON SERPL-MCNC: 30 UG/DL (ref 50–175)
LYMPHOCYTES NFR BLD AUTO: 0.5 /CMM (ref 0.8–4.8)
LYMPHOCYTES NFR BLD AUTO: 8.3 % (ref 20–44)
LYMPHOCYTES NFR BLD MANUAL: 9 % (ref 16–48)
MAGNESIUM SERPL-MCNC: 1.4 MG/DL (ref 1.8–2.4)
MCHC RBC AUTO-ENTMCNC: 33 G/DL (ref 31–36)
MCV RBC AUTO: 83 FL (ref 82–100)
METAMYELOCYTES NFR BLD MANUAL: 2 % (ref 0–0)
MONOCYTES NFR BLD AUTO: 0.6 /CMM (ref 0.1–1.3)
MONOCYTES NFR BLD AUTO: 10.3 % (ref 2–12)
MONOCYTES NFR BLD MANUAL: 12 % (ref 0–11)
MYELOCYTES NFR BLD MANUAL: 9 % (ref 0–0)
NEUTROPHILS # BLD AUTO: 4.5 /CMM (ref 1.8–8.9)
NEUTROPHILS NFR BLD AUTO: 80.4 % (ref 43–81)
NEUTS BAND NFR BLD MANUAL: 14 % (ref 0–5)
NEUTS SEG NFR BLD MANUAL: 52 % (ref 42–76)
PHOSPHATE SERPL-MCNC: 2.7 MG/DL (ref 2.5–4.9)
PLATELET # BLD AUTO: 181 /CMM (ref 150–450)
POTASSIUM SERPL-SCNC: 3.3 MMOL/L (ref 3.5–5.1)
RBC # BLD AUTO: 3.52 MIL/UL (ref 4–5.2)
SODIUM SERPL-SCNC: 133 MMOL/L (ref 136–145)
TIBC SERPL-MCNC: 98 UG/DL (ref 250–450)
WBC NRBC COR # BLD AUTO: 5.6 K/UL (ref 4.3–11)

## 2019-07-07 RX ADMIN — FAMOTIDINE SCH MG: 10 INJECTION INTRAVENOUS at 08:44

## 2019-07-07 RX ADMIN — MAGNESIUM SULFATE IN DEXTROSE SCH MLS/HR: 10 INJECTION, SOLUTION INTRAVENOUS at 16:14

## 2019-07-07 RX ADMIN — DEXTROSE MONOHYDRATE SCH MLS/HR: 50 INJECTION, SOLUTION INTRAVENOUS at 05:40

## 2019-07-07 RX ADMIN — CEFEPIME HYDROCHLORIDE SCH MLS/HR: 1 INJECTION, POWDER, FOR SOLUTION INTRAMUSCULAR; INTRAVENOUS at 10:52

## 2019-07-07 RX ADMIN — MAGNESIUM SULFATE IN DEXTROSE SCH MLS/HR: 10 INJECTION, SOLUTION INTRAVENOUS at 18:29

## 2019-07-07 RX ADMIN — FAMOTIDINE SCH MG: 10 INJECTION INTRAVENOUS at 20:39

## 2019-07-07 RX ADMIN — Medication SCH EA: at 22:09

## 2019-07-07 RX ADMIN — Medication SCH ML: at 17:00

## 2019-07-07 RX ADMIN — Medication PRN MG: at 05:03

## 2019-07-07 RX ADMIN — CEFEPIME HYDROCHLORIDE SCH MLS/HR: 1 INJECTION, POWDER, FOR SOLUTION INTRAMUSCULAR; INTRAVENOUS at 20:40

## 2019-07-07 RX ADMIN — Medication SCH ML: at 08:00

## 2019-07-07 RX ADMIN — DEXTROSE MONOHYDRATE SCH MLS/HR: 50 INJECTION, SOLUTION INTRAVENOUS at 11:32

## 2019-07-07 RX ADMIN — INSULIN GLARGINE SCH UNIT: 100 INJECTION, SOLUTION SUBCUTANEOUS at 21:48

## 2019-07-07 RX ADMIN — MAGNESIUM SULFATE IN DEXTROSE SCH MLS/HR: 10 INJECTION, SOLUTION INTRAVENOUS at 14:48

## 2019-07-07 RX ADMIN — MAGNESIUM SULFATE IN DEXTROSE SCH MLS/HR: 10 INJECTION, SOLUTION INTRAVENOUS at 17:19

## 2019-07-07 RX ADMIN — Medication SCH MLS/HR: at 08:30

## 2019-07-07 RX ADMIN — Medication SCH EA: at 09:05

## 2019-07-07 RX ADMIN — Medication SCH ML: at 12:00

## 2019-07-07 RX ADMIN — MAGNESIUM OXIDE TAB 400 MG (241.3 MG ELEMENTAL MG) SCH MG: 400 (241.3 MG) TAB at 17:47

## 2019-07-07 RX ADMIN — Medication SCH EA: at 12:00

## 2019-07-07 RX ADMIN — DEXTROSE MONOHYDRATE SCH MLS/HR: 50 INJECTION, SOLUTION INTRAVENOUS at 20:40

## 2019-07-07 RX ADMIN — DEXTROSE MONOHYDRATE SCH MLS/HR: 50 INJECTION, SOLUTION INTRAVENOUS at 13:15

## 2019-07-07 RX ADMIN — Medication PRN MG: at 14:09

## 2019-07-07 RX ADMIN — MAGNESIUM OXIDE TAB 400 MG (241.3 MG ELEMENTAL MG) SCH MG: 400 (241.3 MG) TAB at 08:44

## 2019-07-07 RX ADMIN — Medication SCH EA: at 17:30

## 2019-07-07 NOTE — NUR
RN NOTES



NO ACYCLOVIR IN STOCK. PAGED NURSING SUP, NILSON. SHE SAID SHE WILL BRING IT. AWAITING 
MEDICATION.

## 2019-07-07 NOTE — NUR
RN CLOSING NOTES



PATIENT RESTING IN BED, EASILY AROUSABLE. A/O X 2. NO SIGNS OF RESPIRATORY DISTRESS OR 
DISCOMFORT. ON 3LPM O2 VIA NC. HAS RONAN PICC LINE, PATENT AND INTACT. NO SIGNS OF REDNESS OF 
INFILTRATION NOTED. NO COMPLAINTS OF PAIN OR DISCOMFORT AT THIS TIME. ALL NEEDS MET AT THIS 
TIME. NO ACUTE CHANGES THROUGHOUT THE SHIFT. PATIENT KEPT CLEAN, DRY, AND COMFORTABLE. 
PATIENT REPOSITIONED EVERY 2 HOURS AND AS NEEDED WHEN REQUESTED, Z GUARD APPLIED. SAFETY 
PRECAUTIONS IMPLEMENTED; CALL LIGHT WITHIN REACH, BED LOWEST POSITION, BED LOCKED, SIDE 
RAILS UP X2. WILL ENDORSE TO AM RN FOR CONTINUITY OF CARE.

## 2019-07-07 NOTE — NUR
RN MS OPENING NOTE 

RECEIVED PT IN BED, AWAKE ALERT ORIENTED X2 ,FAMILY AT BEDSIDE. BREATHING EVEN AND UNLABORED 
ON 2L O2 NC, IN NO APPARENT PAIN OR DISCOMFORT AT THIS TIME, R UA PICCLINE IN PLACE. BED IN 
LOWEST LOCKED POSITION, CALL LIGHT WITHIN REACH AT ALL TIMES, WILL CONTINUE TO MONITOR 
FREQUENTLY

## 2019-07-07 NOTE — NUR
MS/RN  NOTE



RECEIVED THE PATIENT IN BED AND SLEEPING. THE PATIENT IS RESPONSIVE TO VERBAL AND TACTILE 
STIMULI. RECEIVING OXYGEN AT 3L/MIN VIA NASAL CANNULA AND SATURATION IS AT 96%. RONAN PICC 
LINE PATENT AND SALINE LOCKED. BED LOW AND LOCKED. SIDE RAILS UP X3. CALL LIGHT WITHIN 
REACH. WILL CONTINUE TO MONITOR.

## 2019-07-07 NOTE — NUR
MS/RN  NOTE



THE PATIENT ALERT AND ORIENTED X2. ABLE TO MAKE NEED SKNOWN VERBALLY. RECEIVING OXYGEN 
3L/MIN VIA NASAL CANNULA AND SATURATION IS AT 97%. DENIES SOB. RESPIRATION REGULAR AND 
UNLABORED. DENIES PAIN. THE PATIENT IN NO APPARENT DISTRESS. RONAN PICC LINE PATENT AND MG 
INFUSING PER ORDER. NO S/S INFILTRATING NOTED. BED LOW AND LOCKED. SIDE RAILS UP X3. CALL 
LIGHT WITHIN REACH. WILL ENDORSE TO NIGHT SHIFT.

## 2019-07-08 VITALS — SYSTOLIC BLOOD PRESSURE: 115 MMHG | DIASTOLIC BLOOD PRESSURE: 96 MMHG

## 2019-07-08 VITALS — SYSTOLIC BLOOD PRESSURE: 150 MMHG | DIASTOLIC BLOOD PRESSURE: 75 MMHG

## 2019-07-08 VITALS — SYSTOLIC BLOOD PRESSURE: 140 MMHG | DIASTOLIC BLOOD PRESSURE: 77 MMHG

## 2019-07-08 LAB
BASOPHILS # BLD AUTO: 0 /CMM (ref 0–0.2)
BASOPHILS NFR BLD AUTO: 0.6 % (ref 0–2)
BUN SERPL-MCNC: 7 MG/DL (ref 7–18)
CALCIUM SERPL-MCNC: 7.7 MG/DL (ref 8.5–10.1)
CHLORIDE SERPL-SCNC: 95 MMOL/L (ref 98–107)
CO2 SERPL-SCNC: 34 MMOL/L (ref 21–32)
CREAT SERPL-MCNC: 0.9 MG/DL (ref 0.6–1.3)
EOSINOPHIL NFR BLD AUTO: 0.7 % (ref 0–6)
GLUCOSE SERPL-MCNC: 322 MG/DL (ref 74–106)
HCT VFR BLD AUTO: 29 % (ref 33–45)
HGB BLD-MCNC: 9.7 G/DL (ref 11.5–14.8)
LYMPHOCYTES NFR BLD AUTO: 0.5 /CMM (ref 0.8–4.8)
LYMPHOCYTES NFR BLD AUTO: 10.2 % (ref 20–44)
LYMPHOCYTES NFR BLD MANUAL: 15 % (ref 16–48)
MAGNESIUM SERPL-MCNC: 1.6 MG/DL (ref 1.8–2.4)
MCHC RBC AUTO-ENTMCNC: 33 G/DL (ref 31–36)
MCV RBC AUTO: 83 FL (ref 82–100)
METAMYELOCYTES NFR BLD MANUAL: 2 % (ref 0–0)
MONOCYTES NFR BLD AUTO: 0.6 /CMM (ref 0.1–1.3)
MONOCYTES NFR BLD AUTO: 13.1 % (ref 2–12)
MONOCYTES NFR BLD MANUAL: 14 % (ref 0–11)
MYELOCYTES NFR BLD MANUAL: 2 % (ref 0–0)
NEUTROPHILS # BLD AUTO: 3.5 /CMM (ref 1.8–8.9)
NEUTROPHILS NFR BLD AUTO: 75.4 % (ref 43–81)
NEUTS BAND NFR BLD MANUAL: 3 % (ref 0–5)
NEUTS SEG NFR BLD MANUAL: 64 % (ref 42–76)
PHOSPHATE SERPL-MCNC: 2.4 MG/DL (ref 2.5–4.9)
PLATELET # BLD AUTO: 183 /CMM (ref 150–450)
POTASSIUM SERPL-SCNC: 3.1 MMOL/L (ref 3.5–5.1)
RBC # BLD AUTO: 3.48 MIL/UL (ref 4–5.2)
SODIUM SERPL-SCNC: 133 MMOL/L (ref 136–145)
WBC NRBC COR # BLD AUTO: 4.6 K/UL (ref 4.3–11)

## 2019-07-08 RX ADMIN — MAGNESIUM OXIDE TAB 400 MG (241.3 MG ELEMENTAL MG) SCH MG: 400 (241.3 MG) TAB at 18:00

## 2019-07-08 RX ADMIN — Medication SCH ML: at 08:16

## 2019-07-08 RX ADMIN — MAGNESIUM SULFATE IN DEXTROSE SCH MLS/HR: 10 INJECTION, SOLUTION INTRAVENOUS at 12:56

## 2019-07-08 RX ADMIN — CEFEPIME HYDROCHLORIDE SCH MLS/HR: 1 INJECTION, POWDER, FOR SOLUTION INTRAMUSCULAR; INTRAVENOUS at 20:01

## 2019-07-08 RX ADMIN — Medication SCH EA: at 12:13

## 2019-07-08 RX ADMIN — POTASSIUM CHLORIDE SCH MEQ: 1.5 POWDER, FOR SOLUTION ORAL at 12:56

## 2019-07-08 RX ADMIN — Medication SCH EACH: at 21:29

## 2019-07-08 RX ADMIN — DEXTROSE MONOHYDRATE SCH MLS/HR: 50 INJECTION, SOLUTION INTRAVENOUS at 04:15

## 2019-07-08 RX ADMIN — CEFEPIME HYDROCHLORIDE SCH MLS/HR: 1 INJECTION, POWDER, FOR SOLUTION INTRAMUSCULAR; INTRAVENOUS at 09:16

## 2019-07-08 RX ADMIN — INSULIN HUMAN PRN UNIT: 100 INJECTION, SOLUTION PARENTERAL at 17:59

## 2019-07-08 RX ADMIN — Medication SCH EA: at 21:32

## 2019-07-08 RX ADMIN — INSULIN HUMAN PRN UNIT: 100 INJECTION, SOLUTION PARENTERAL at 21:33

## 2019-07-08 RX ADMIN — Medication SCH EA: at 08:12

## 2019-07-08 RX ADMIN — FAMOTIDINE SCH MG: 10 INJECTION INTRAVENOUS at 08:11

## 2019-07-08 RX ADMIN — Medication SCH MLS/HR: at 09:16

## 2019-07-08 RX ADMIN — DEXTROSE MONOHYDRATE SCH MLS/HR: 50 INJECTION, SOLUTION INTRAVENOUS at 21:47

## 2019-07-08 RX ADMIN — Medication SCH ML: at 18:05

## 2019-07-08 RX ADMIN — MAGNESIUM OXIDE TAB 400 MG (241.3 MG ELEMENTAL MG) SCH MG: 400 (241.3 MG) TAB at 08:11

## 2019-07-08 RX ADMIN — Medication SCH EA: at 18:05

## 2019-07-08 RX ADMIN — INSULIN GLARGINE SCH UNIT: 100 INJECTION, SOLUTION SUBCUTANEOUS at 21:32

## 2019-07-08 RX ADMIN — FAMOTIDINE SCH MG: 10 INJECTION INTRAVENOUS at 20:01

## 2019-07-08 RX ADMIN — DEXTROSE MONOHYDRATE SCH MLS/HR: 50 INJECTION, SOLUTION INTRAVENOUS at 20:45

## 2019-07-08 RX ADMIN — MAGNESIUM SULFATE IN DEXTROSE SCH MLS/HR: 10 INJECTION, SOLUTION INTRAVENOUS at 11:34

## 2019-07-08 RX ADMIN — DEXTROSE MONOHYDRATE SCH MLS/HR: 50 INJECTION, SOLUTION INTRAVENOUS at 12:59

## 2019-07-08 RX ADMIN — Medication SCH ML: at 12:13

## 2019-07-08 RX ADMIN — POTASSIUM CHLORIDE SCH MEQ: 1.5 POWDER, FOR SOLUTION ORAL at 11:34

## 2019-07-08 RX ADMIN — DEXTROSE MONOHYDRATE SCH MLS/HR: 50 INJECTION, SOLUTION INTRAVENOUS at 02:47

## 2019-07-08 NOTE — NUR
MS RN AM NOTE 

RECEIVED PT IN BED, AWAKE ALERT ORIENTED X2 , SON AT BEDSIDE. ON 3L O2 VIA NASAL CANULA, NO 
SO, NOT IN DISTRESS, RESPIRATION UNLABORED,NO APPARENT SIGNS OF PAIN OR DISCOMFORT AT THIS 
TIME, R UA PICCLINE IN PLACE, FLUSHES WELL, CDI DRESSING, SITE CLEAR, PUREED DIET/FEEDER. 
BED IN LOWEST LOCKED POSITION, CALL LIGHT WITHIN REACH AT ALL TIMES, WILL TURN AND 
REPOSITION Q 2HOURS. WILL CONTINUE TO MONITOR FREQUENTLY

## 2019-07-08 NOTE — NUR
RN MS CLOSING NOTE 

PT REMAINS IN BED, SLEEPING, EASILY AROUSED TO TOUCH ,SON AT BEDSIDE. BREATHING EVEN AND 
UNLABORED ON 2L O2 NC, IN NO APPARENT PAIN OR DISCOMFORT AT THIS TIME, R UA PICCLINE IN 
PLACE. BED IN LOWEST LOCKED POSITION, CALL LIGHT WITHIN REACH AT ALL TIMES, WILL ENDORSE TO 
DAY NURSE FOR SPRING

## 2019-07-08 NOTE — NUR
RN MS OPENING NOTES

RECEIVED PATIENT IN BED AWAKE, ALERT AND ORIENTED X1, VERBALLY RESPONSIVE. FAMILY AT 
BEDSIDE. BREATHING EVEN AND UNLABORED. NO SOB NOTED. ON 3LPM VIA NC. CURRENTLY WITH NO 
COMPLAINTS OF PAIN OR DISCOMFORT. NO FACIAL GRIMACING. RIGHT UPPER ARM PICC LINE INTACT AND 
PATENT.  SKIN DRY AND WARM TO TOUCH. AFEBRILE. ALL OTHER NEEDS ATTENDED TO . SAFETY MEASURES 
IN PLACE. CALL LIGHT WITHIN REACH. WILL CONTINUE TO MONITOR.

## 2019-07-09 VITALS — SYSTOLIC BLOOD PRESSURE: 92 MMHG | DIASTOLIC BLOOD PRESSURE: 43 MMHG

## 2019-07-09 VITALS — SYSTOLIC BLOOD PRESSURE: 141 MMHG | DIASTOLIC BLOOD PRESSURE: 77 MMHG

## 2019-07-09 VITALS — DIASTOLIC BLOOD PRESSURE: 62 MMHG | SYSTOLIC BLOOD PRESSURE: 129 MMHG

## 2019-07-09 VITALS — SYSTOLIC BLOOD PRESSURE: 120 MMHG | DIASTOLIC BLOOD PRESSURE: 90 MMHG

## 2019-07-09 LAB
BASOPHILS # BLD AUTO: 0.1 /CMM (ref 0–0.2)
BASOPHILS NFR BLD AUTO: 1 % (ref 0–2)
BUN SERPL-MCNC: 8 MG/DL (ref 7–18)
CALCIUM SERPL-MCNC: 7.9 MG/DL (ref 8.5–10.1)
CHLORIDE SERPL-SCNC: 96 MMOL/L (ref 98–107)
CO2 SERPL-SCNC: 33 MMOL/L (ref 21–32)
CREAT SERPL-MCNC: 0.7 MG/DL (ref 0.6–1.3)
EOSINOPHIL NFR BLD AUTO: 0.9 % (ref 0–6)
GLUCOSE SERPL-MCNC: 120 MG/DL (ref 74–106)
HCT VFR BLD AUTO: 30 % (ref 33–45)
HGB BLD-MCNC: 10 G/DL (ref 11.5–14.8)
LYMPHOCYTES NFR BLD AUTO: 0.6 /CMM (ref 0.8–4.8)
LYMPHOCYTES NFR BLD AUTO: 10.3 % (ref 20–44)
LYMPHOCYTES NFR BLD MANUAL: 13 % (ref 16–48)
MAGNESIUM SERPL-MCNC: 1.7 MG/DL (ref 1.8–2.4)
MCHC RBC AUTO-ENTMCNC: 34 G/DL (ref 31–36)
MCV RBC AUTO: 82 FL (ref 82–100)
METAMYELOCYTES NFR BLD MANUAL: 2 % (ref 0–0)
MONOCYTES NFR BLD AUTO: 0.7 /CMM (ref 0.1–1.3)
MONOCYTES NFR BLD AUTO: 11.8 % (ref 2–12)
MONOCYTES NFR BLD MANUAL: 9 % (ref 0–11)
MYELOCYTES NFR BLD MANUAL: 5 % (ref 0–0)
NEUTROPHILS # BLD AUTO: 4.2 /CMM (ref 1.8–8.9)
NEUTROPHILS NFR BLD AUTO: 76 % (ref 43–81)
NEUTS BAND NFR BLD MANUAL: 6 % (ref 0–5)
NEUTS SEG NFR BLD MANUAL: 65 % (ref 42–76)
PHOSPHATE SERPL-MCNC: 2.6 MG/DL (ref 2.5–4.9)
PLATELET # BLD AUTO: 200 /CMM (ref 150–450)
POTASSIUM SERPL-SCNC: 3 MMOL/L (ref 3.5–5.1)
RBC # BLD AUTO: 3.64 MIL/UL (ref 4–5.2)
SODIUM SERPL-SCNC: 134 MMOL/L (ref 136–145)
WBC NRBC COR # BLD AUTO: 5.5 K/UL (ref 4.3–11)

## 2019-07-09 RX ADMIN — Medication SCH EACH: at 06:35

## 2019-07-09 RX ADMIN — DEXTROSE MONOHYDRATE SCH MLS/HR: 50 INJECTION, SOLUTION INTRAVENOUS at 22:46

## 2019-07-09 RX ADMIN — INSULIN HUMAN PRN UNIT: 100 INJECTION, SOLUTION PARENTERAL at 06:36

## 2019-07-09 RX ADMIN — Medication SCH ML: at 17:00

## 2019-07-09 RX ADMIN — Medication SCH EA: at 12:00

## 2019-07-09 RX ADMIN — Medication SCH MLS/HR: at 17:50

## 2019-07-09 RX ADMIN — POTASSIUM CHLORIDE SCH MLS/HR: 200 INJECTION, SOLUTION INTRAVENOUS at 11:17

## 2019-07-09 RX ADMIN — POTASSIUM CHLORIDE SCH MLS/HR: 200 INJECTION, SOLUTION INTRAVENOUS at 12:26

## 2019-07-09 RX ADMIN — Medication SCH EA: at 22:09

## 2019-07-09 RX ADMIN — Medication SCH ML: at 08:00

## 2019-07-09 RX ADMIN — POTASSIUM CHLORIDE SCH MLS/HR: 200 INJECTION, SOLUTION INTRAVENOUS at 14:49

## 2019-07-09 RX ADMIN — MAGNESIUM OXIDE TAB 400 MG (241.3 MG ELEMENTAL MG) SCH MG: 400 (241.3 MG) TAB at 09:52

## 2019-07-09 RX ADMIN — INSULIN HUMAN PRN UNIT: 100 INJECTION, SOLUTION PARENTERAL at 21:17

## 2019-07-09 RX ADMIN — INSULIN GLARGINE SCH UNIT: 100 INJECTION, SOLUTION SUBCUTANEOUS at 21:32

## 2019-07-09 RX ADMIN — Medication SCH EACH: at 21:20

## 2019-07-09 RX ADMIN — Medication PRN MG: at 12:12

## 2019-07-09 RX ADMIN — FAMOTIDINE SCH MG: 10 INJECTION INTRAVENOUS at 09:52

## 2019-07-09 RX ADMIN — DEXTROSE MONOHYDRATE SCH MLS/HR: 50 INJECTION, SOLUTION INTRAVENOUS at 13:39

## 2019-07-09 RX ADMIN — Medication SCH EACH: at 18:01

## 2019-07-09 RX ADMIN — Medication SCH ML: at 12:00

## 2019-07-09 RX ADMIN — CEFEPIME HYDROCHLORIDE SCH MLS/HR: 1 INJECTION, POWDER, FOR SOLUTION INTRAMUSCULAR; INTRAVENOUS at 09:52

## 2019-07-09 RX ADMIN — MAGNESIUM OXIDE TAB 400 MG (241.3 MG ELEMENTAL MG) SCH MG: 400 (241.3 MG) TAB at 18:22

## 2019-07-09 RX ADMIN — Medication SCH EACH: at 12:25

## 2019-07-09 RX ADMIN — DEXTROSE MONOHYDRATE SCH MLS/HR: 50 INJECTION, SOLUTION INTRAVENOUS at 16:09

## 2019-07-09 RX ADMIN — FAMOTIDINE SCH MG: 10 INJECTION INTRAVENOUS at 21:09

## 2019-07-09 RX ADMIN — DEXTROSE MONOHYDRATE SCH MLS/HR: 50 INJECTION, SOLUTION INTRAVENOUS at 05:08

## 2019-07-09 RX ADMIN — MAGNESIUM SULFATE IN DEXTROSE SCH MLS/HR: 10 INJECTION, SOLUTION INTRAVENOUS at 21:33

## 2019-07-09 RX ADMIN — Medication SCH EA: at 18:22

## 2019-07-09 RX ADMIN — Medication SCH EA: at 08:46

## 2019-07-09 RX ADMIN — POTASSIUM CHLORIDE SCH MLS/HR: 200 INJECTION, SOLUTION INTRAVENOUS at 20:28

## 2019-07-09 NOTE — NUR
RN MS CLOSING NOTES

PATIENT RESTING IN BED. NO ACUTE CHANGES THROUGHOUT SHIFT.  BREATHING EVEN AND UNLABORED. NO 
SOB NOTED. ON 3LMP OXYGEN VIA NC. NO S/S OF PAIN OR DISCOMFORT. NO FACIAL GRIMACING. RIGHT 
UPPER ARM PICC LINE INTACT AND PATENT. TURNED AND REPOSITIONED Q2H. ALL OTHER NEEDS ATTENDED 
TO. KEPT CLEAN DRY AND COMFORTABLE. SAFETY MEASURES IN PLACE. CALL LIGHT WITHIN REACH. WILL 
ENDORSE TO ONCOMING NURSE FOR SPRING.

## 2019-07-09 NOTE — NUR
CHANGE OF SHIFT REPORT

Patient in bed, sleeping arouses easily. On 3L oxygen via NC, non productive cough. No 
evidence of SOB, maintained upright position. RONAN PICC line, IV Potassium and IV Magnesium 
to be completed to infuse per report. Contact precaution, maintained safety.

## 2019-07-09 NOTE — NUR
both magnesium replacements and one potassium replacment not completed due to multiple 
antibiotic orders for pt. as well.pt. with double lumen picc.pt. not given iv meds combined 
as has a lot of congestion with cough already.

## 2019-07-10 VITALS — DIASTOLIC BLOOD PRESSURE: 71 MMHG | SYSTOLIC BLOOD PRESSURE: 139 MMHG

## 2019-07-10 VITALS — SYSTOLIC BLOOD PRESSURE: 126 MMHG | DIASTOLIC BLOOD PRESSURE: 59 MMHG

## 2019-07-10 LAB
BUN SERPL-MCNC: 8 MG/DL (ref 7–18)
CALCIUM SERPL-MCNC: 7.7 MG/DL (ref 8.5–10.1)
CHLORIDE SERPL-SCNC: 96 MMOL/L (ref 98–107)
CO2 SERPL-SCNC: 32 MMOL/L (ref 21–32)
CREAT SERPL-MCNC: 0.7 MG/DL (ref 0.6–1.3)
GLUCOSE SERPL-MCNC: 135 MG/DL (ref 74–106)
POTASSIUM SERPL-SCNC: 3.4 MMOL/L (ref 3.5–5.1)
SODIUM SERPL-SCNC: 133 MMOL/L (ref 136–145)

## 2019-07-10 RX ADMIN — FAMOTIDINE SCH MG: 10 INJECTION INTRAVENOUS at 08:12

## 2019-07-10 RX ADMIN — Medication SCH EACH: at 17:09

## 2019-07-10 RX ADMIN — Medication SCH EA: at 17:09

## 2019-07-10 RX ADMIN — Medication SCH EACH: at 07:24

## 2019-07-10 RX ADMIN — INSULIN HUMAN PRN UNIT: 100 INJECTION, SOLUTION PARENTERAL at 17:22

## 2019-07-10 RX ADMIN — Medication PRN MG: at 11:51

## 2019-07-10 RX ADMIN — DEXTROSE MONOHYDRATE SCH MLS/HR: 50 INJECTION, SOLUTION INTRAVENOUS at 05:59

## 2019-07-10 RX ADMIN — MAGNESIUM OXIDE TAB 400 MG (241.3 MG ELEMENTAL MG) SCH MG: 400 (241.3 MG) TAB at 08:12

## 2019-07-10 RX ADMIN — Medication SCH ML: at 08:13

## 2019-07-10 RX ADMIN — Medication SCH ML: at 17:09

## 2019-07-10 RX ADMIN — DEXTROSE MONOHYDRATE SCH MLS/HR: 50 INJECTION, SOLUTION INTRAVENOUS at 13:35

## 2019-07-10 RX ADMIN — INSULIN HUMAN PRN UNIT: 100 INJECTION, SOLUTION PARENTERAL at 11:54

## 2019-07-10 RX ADMIN — Medication SCH EA: at 08:12

## 2019-07-10 RX ADMIN — MAGNESIUM OXIDE TAB 400 MG (241.3 MG ELEMENTAL MG) SCH MG: 400 (241.3 MG) TAB at 17:09

## 2019-07-10 RX ADMIN — Medication SCH EA: at 12:05

## 2019-07-10 RX ADMIN — Medication SCH ML: at 12:04

## 2019-07-10 RX ADMIN — MAGNESIUM SULFATE IN DEXTROSE SCH MLS/HR: 10 INJECTION, SOLUTION INTRAVENOUS at 00:06

## 2019-07-10 RX ADMIN — Medication SCH MLS/HR: at 08:12

## 2019-07-10 RX ADMIN — INSULIN HUMAN PRN UNIT: 100 INJECTION, SOLUTION PARENTERAL at 07:25

## 2019-07-10 RX ADMIN — Medication SCH EACH: at 11:51

## 2019-07-10 NOTE — NUR
MS RN NOTE



PT DISCHARGED SAFETLY FROM FLOOR ACCOMPANIED BY AMBULANCE STAFF, AND 2 SONS. PT IN STABLE 
CONDITION, NON-VERBAL. NO SIGNS OF SOB OR DISTRESS, NO INDICATIONS OF PAIN. BODY CHECK DONE, 
BELONGINGS TAKEN WITH PATIENT. DISCUSSED DISCHARGE PAPERWORK, SIGNED BY PT. SON. PICC LINE 
LEFT IN RONAN, PER MD NOTES.

## 2019-07-10 NOTE — NUR
MS RN NOTE



RECEIVED PT IN STABLE CONDITION, NON-VERBAL ON O2 3L TOLERATING WELL. FAMILY AT BEDSIDE. NO 
SIGNS OF SOB OR DISTRESS, NO INDICATION OF PAIN. AWAITING FOR CT HEAD RESULT TO SEE IF PT 
WILL D/C FROM [facility]. ALL CURRENT NEEDS MET. BED LOW, LOCKED, UPPER RAILS UP, AND CALL LIGHT 
WITHIN REACH. WILL CONT. TO  MONITOR.

## 2019-07-10 NOTE — NUR
MS RN NOTE



SPOKE WITH ABHISHEK, . PER ABHISHEK, "SPOKE WITH DR. BRAND, PT TO BE DISCHARGED 
AFTER HEAD CT IS RESULTED."

## 2019-07-10 NOTE — NUR
MS RN NOTE



CALLED [facility] TO GIVE REPORT, PER [place] RN GAVE REPORT ALREADY.

## 2019-07-10 NOTE — NUR
RN NOTE

PATIENT IS BEING DISCHARGED TO [facility].  PAPERWORK COMPLETED AND SIGNED BY SON.  
REPORT CALLED TO ADRIANO.  AWAITING TRANSPORTATION.  PICTURES TAKEN AND PLACED IN CHART.

## 2019-07-10 NOTE — NUR
END OF SHIFT REPORT 

Patient in bed, sleeping arouses easily. Patient is A/O x1, son assist with translation, per 
son patient is not confused. Patient with non productive cough, moderate thick sputum, 
suction oral PRN. Maintained upright position, remains on supplemental oxygen via NC. On IV 
abx as scheduled with no adverse s/e. Contact precaution, PPE utilized. Maintained safety. 
Plan discharge to [facility] today, CM following. Will endorse to oncoming RN.